# Patient Record
Sex: FEMALE | Race: BLACK OR AFRICAN AMERICAN | Employment: OTHER | URBAN - METROPOLITAN AREA
[De-identification: names, ages, dates, MRNs, and addresses within clinical notes are randomized per-mention and may not be internally consistent; named-entity substitution may affect disease eponyms.]

---

## 2017-02-20 RX ORDER — LOSARTAN POTASSIUM AND HYDROCHLOROTHIAZIDE 25; 100 MG/1; MG/1
TABLET ORAL
Qty: 90 TABLET | Refills: 0 | Status: SHIPPED | OUTPATIENT
Start: 2017-02-20 | End: 2017-10-31 | Stop reason: SDUPTHER

## 2017-03-27 ENCOUNTER — OFFICE VISIT (OUTPATIENT)
Dept: INTERNAL MEDICINE | Age: 72
End: 2017-03-27

## 2017-03-27 VITALS
WEIGHT: 191 LBS | DIASTOLIC BLOOD PRESSURE: 80 MMHG | SYSTOLIC BLOOD PRESSURE: 138 MMHG | BODY MASS INDEX: 29.98 KG/M2 | HEIGHT: 67 IN

## 2017-03-27 DIAGNOSIS — E66.09 NON MORBID OBESITY DUE TO EXCESS CALORIES: ICD-10-CM

## 2017-03-27 DIAGNOSIS — N18.2 CHRONIC KIDNEY DISEASE, STAGE II (MILD): Chronic | ICD-10-CM

## 2017-03-27 DIAGNOSIS — R73.9 HYPERGLYCEMIA: ICD-10-CM

## 2017-03-27 DIAGNOSIS — R76.8 HEPATITIS C ANTIBODY POSITIVE IN BLOOD: ICD-10-CM

## 2017-03-27 DIAGNOSIS — M17.0 PRIMARY OSTEOARTHRITIS OF BOTH KNEES: Primary | ICD-10-CM

## 2017-03-27 DIAGNOSIS — J45.20 MILD INTERMITTENT ASTHMA WITHOUT COMPLICATION: ICD-10-CM

## 2017-03-27 DIAGNOSIS — I10 ESSENTIAL HYPERTENSION: ICD-10-CM

## 2017-03-27 PROCEDURE — 99214 OFFICE O/P EST MOD 30 MIN: CPT | Performed by: INTERNAL MEDICINE

## 2017-03-27 RX ORDER — ALBUTEROL SULFATE 90 UG/1
2 AEROSOL, METERED RESPIRATORY (INHALATION) 3 TIMES DAILY PRN
Qty: 1 INHALER | Refills: 5 | Status: SHIPPED | OUTPATIENT
Start: 2017-03-27 | End: 2017-11-28 | Stop reason: SDUPTHER

## 2017-03-27 RX ORDER — LEDIPASVIR AND SOFOSBUVIR 90; 400 MG/1; MG/1
1 TABLET, FILM COATED ORAL DAILY
Qty: 30 TABLET | Refills: 2 | Status: SHIPPED
Start: 2017-03-27 | End: 2017-11-28 | Stop reason: SDUPTHER

## 2017-03-27 RX ORDER — MELOXICAM 15 MG/1
15 TABLET ORAL DAILY
Qty: 30 TABLET | Refills: 3 | Status: SHIPPED | OUTPATIENT
Start: 2017-03-27 | End: 2017-07-28 | Stop reason: SDUPTHER

## 2017-03-27 RX ORDER — BUDESONIDE AND FORMOTEROL FUMARATE DIHYDRATE 160; 4.5 UG/1; UG/1
2 AEROSOL RESPIRATORY (INHALATION) 2 TIMES DAILY
Qty: 1 INHALER | Refills: 5 | Status: SHIPPED | OUTPATIENT
Start: 2017-03-27 | End: 2017-11-28 | Stop reason: SDUPTHER

## 2017-03-27 ASSESSMENT — ENCOUNTER SYMPTOMS
BACK PAIN: 0
ABDOMINAL PAIN: 0
WHEEZING: 0
CHEST TIGHTNESS: 0
COLOR CHANGE: 0

## 2017-07-28 RX ORDER — MELOXICAM 15 MG/1
TABLET ORAL
Qty: 30 TABLET | Refills: 2 | Status: SHIPPED | OUTPATIENT
Start: 2017-07-28 | End: 2017-11-28 | Stop reason: SDUPTHER

## 2017-08-31 RX ORDER — AMLODIPINE BESYLATE 10 MG/1
TABLET ORAL
Qty: 30 TABLET | Refills: 5 | Status: SHIPPED | OUTPATIENT
Start: 2017-08-31 | End: 2017-11-28 | Stop reason: SDUPTHER

## 2017-11-07 ENCOUNTER — PATIENT MESSAGE (OUTPATIENT)
Dept: INTERNAL MEDICINE | Age: 72
End: 2017-11-07

## 2017-11-07 DIAGNOSIS — E55.9 VITAMIN D DEFICIENCY: ICD-10-CM

## 2017-11-07 DIAGNOSIS — Z00.00 ANNUAL PHYSICAL EXAM: Primary | ICD-10-CM

## 2017-11-07 NOTE — TELEPHONE ENCOUNTER
From: Antoinette Rangel  To:  Joshua Tomlin MD  Sent: 11/7/2017 7:55 AM EST  Subject: Prescription Question    I need a prescription to have lab work done before I see Dr. Joesph Clayton on Thursday, Nov. 9 at 11:30am   Thank you

## 2017-11-09 ENCOUNTER — OFFICE VISIT (OUTPATIENT)
Dept: INTERNAL MEDICINE | Age: 72
End: 2017-11-09

## 2017-11-09 VITALS
DIASTOLIC BLOOD PRESSURE: 80 MMHG | HEIGHT: 67 IN | SYSTOLIC BLOOD PRESSURE: 148 MMHG | BODY MASS INDEX: 30.13 KG/M2 | WEIGHT: 192 LBS

## 2017-11-09 DIAGNOSIS — E55.9 VITAMIN D DEFICIENCY: ICD-10-CM

## 2017-11-09 DIAGNOSIS — R76.8 HEPATITIS C ANTIBODY POSITIVE IN BLOOD: ICD-10-CM

## 2017-11-09 DIAGNOSIS — N18.2 CHRONIC KIDNEY DISEASE, STAGE II (MILD): Chronic | ICD-10-CM

## 2017-11-09 DIAGNOSIS — Z00.00 WELL ADULT EXAM: Primary | ICD-10-CM

## 2017-11-09 DIAGNOSIS — J45.20 MILD INTERMITTENT ASTHMA WITHOUT COMPLICATION: ICD-10-CM

## 2017-11-09 DIAGNOSIS — E66.09 CLASS 1 OBESITY DUE TO EXCESS CALORIES WITH SERIOUS COMORBIDITY AND BODY MASS INDEX (BMI) OF 30.0 TO 30.9 IN ADULT: ICD-10-CM

## 2017-11-09 DIAGNOSIS — M17.0 PRIMARY OSTEOARTHRITIS OF BOTH KNEES: ICD-10-CM

## 2017-11-09 DIAGNOSIS — Z00.00 ROUTINE GENERAL MEDICAL EXAMINATION AT A HEALTH CARE FACILITY: ICD-10-CM

## 2017-11-09 DIAGNOSIS — R73.9 HYPERGLYCEMIA: ICD-10-CM

## 2017-11-09 DIAGNOSIS — R53.83 FATIGUE, UNSPECIFIED TYPE: ICD-10-CM

## 2017-11-09 DIAGNOSIS — I10 ESSENTIAL HYPERTENSION: ICD-10-CM

## 2017-11-09 PROCEDURE — G0438 PPPS, INITIAL VISIT: HCPCS | Performed by: INTERNAL MEDICINE

## 2017-11-09 PROCEDURE — 99214 OFFICE O/P EST MOD 30 MIN: CPT | Performed by: INTERNAL MEDICINE

## 2017-11-09 ASSESSMENT — PATIENT HEALTH QUESTIONNAIRE - PHQ9: SUM OF ALL RESPONSES TO PHQ QUESTIONS 1-9: 0

## 2017-11-09 ASSESSMENT — ANXIETY QUESTIONNAIRES: GAD7 TOTAL SCORE: 0

## 2017-11-09 NOTE — PROGRESS NOTES
Subjective:      Patient ID: Trixie Meadows is a 81 y.o. female. Patient in for her annual wellness visit. She's been feeling fairly well but still struggling with diet and exercise and her weight has not come down any as of yet. Her blood pressures have been slightly up the past couple of days but otherwise have been well. She does not check her blood sugars but is trying to keep an eye on her carb intake with her history of hyperglycemia. She continues to have knee pain bilaterally and is trying to do her exercises regularly. she is anxious about her hep c results. Review of Systems   Constitutional: Negative for activity change, appetite change, fatigue and fever. HENT: Negative for congestion. Eyes: Negative for visual disturbance. Respiratory: Negative for chest tightness, shortness of breath and wheezing. Cardiovascular: Negative for chest pain and palpitations. Gastrointestinal: Negative for abdominal pain and nausea. Musculoskeletal: Negative for arthralgias and back pain. Skin: Negative for color change and rash. Neurological: Negative for speech difficulty, weakness, light-headedness and headaches. Psychiatric/Behavioral: Negative for dysphoric mood and sleep disturbance. The patient is not nervous/anxious. Objective:   Physical Exam   Constitutional: She is oriented to person, place, and time. She appears well-developed and well-nourished. HENT:   Head: Normocephalic and atraumatic. Mouth/Throat: No oropharyngeal exudate. Eyes: Conjunctivae and EOM are normal. Pupils are equal, round, and reactive to light. No scleral icterus. Neck: Normal range of motion. Neck supple. No thyromegaly present. Cardiovascular: Normal rate, regular rhythm and normal heart sounds. No carotid bruit auscultated bilaterally   Pulmonary/Chest: Effort normal and breath sounds normal. No respiratory distress. Abdominal: She exhibits no distension. There is no tenderness. tablet by mouth daily Yes Anshul Shrestha MD   Cholecalciferol (VITAMIN D) 2000 UNITS TABS Take 1 tablet by mouth daily Yes Anshul Shrestha MD   fluticasone (FLONASE) 50 MCG/ACT nasal spray 1 spray by Nasal route daily Yes Anshul Shrestha MD     Past Medical History:   Diagnosis Date    Allergic rhinitis, cause unspecified 8/23/2010    Asymptomatic varicose veins 8/23/2010    Calcium oxalate renal stones     Chronic kidney disease, stage II (mild) 8/23/2010    ? due to stones    Kidney stone on right side 11/11/2011    Leiomyoma of uterus, unspecified 8/23/2010    Obesity, unspecified 8/23/2010    Pain in limb 8/23/2010    Pap smear 7/16/2009    Negative    S/P colonoscopy 10/11/2006    Normal    Screening mammogram 8/4/2009    (Both) Negative    Unspecified asthma(493.90) 8/23/2010    Unspecified essential hypertension 8/23/2010     Past Surgical History:   Procedure Laterality Date    COLONOSCOPY  07/05/2016    LITHOTRIPSY      x2 last 1/2011    TOTAL NEPHRECTOMY      4/2013 right due to chronic infection     Family History   Problem Relation Age of Onset    Hypertension Mother     Hypertension Father     Stroke Father     Heart Disease Sister     Prostate Cancer Brother        CareTeam (Including outside providers/suppliers regularly involved in providing care):   Patient Care Team:  Anshul Shrestha MD as PCP - General  Fidel Gomez MD as Consulting Physician (Urology)  Grace Fuentes MD as Consulting Physician (Otolaryngology)  Tereza Zepeda MD as Consulting Physician (Urology)    Wt Readings from Last 3 Encounters:   11/09/17 192 lb (87.1 kg)   03/27/17 191 lb (86.6 kg)   11/14/16 186 lb (84.4 kg)     Vitals:    11/09/17 1159 11/09/17 1203   BP: (!) 150/80 (!) 148/80   Weight: 192 lb (87.1 kg)    Height: 5' 7\" (1.702 m)            The following problems were reviewed today and where indicated follow up appointments were made and/or referrals ordered.     Risk Factor Screenings

## 2017-11-09 NOTE — ASSESSMENT & PLAN NOTE
Stable- Need to control all risk factors- monitor blood pressure, blood sugars , and lipids carefully and treat aggressively to avoid progression of renal disease

## 2017-11-09 NOTE — PATIENT INSTRUCTIONS
Get wrist bp cuff  Check bp 3-4 times a week preferably when you get home from work after sitting for 10 mins and in a chair w a back and both feet down  Goal is under 140/85    See eye doctor  See me in 6 mos     Personalized Preventive Plan for Wei Kaur - 11/9/2017  Medicare offers a range of preventive health benefits. Some of the tests and screenings are paid in full while other may be subject to a deductible, co-insurance, and/or copay. Some of these benefits include a comprehensive review of your medical history including lifestyle, illnesses that may run in your family, and various assessments and screenings as appropriate. After reviewing your medical record and screening and assessments performed today your provider may have ordered immunizations, labs, imaging, and/or referrals for you. A list of these orders (if applicable) as well as your Preventive Care list are included within your After Visit Summary for your review. Other Preventive Recommendations:    · A preventive eye exam performed by an eye specialist is recommended every 1-2 years to screen for glaucoma; cataracts, macular degeneration, and other eye disorders. · A preventive dental visit is recommended every 6 months. · Try to get at least 150 minutes of exercise per week or 10,000 steps per day on a pedometer . · Order or download the FREE \"Exercise & Physical Activity: Your Everyday Guide\" from [employer]. Call 0-733.324.5676 or search [employer] online. · You need 9232-3443 mg of calcium and 5831-3385 IU of vitamin D per day. It is possible to meet your calcium requirement with diet alone, but a vitamin D supplement is usually necessary to meet this goal.  · When exposed to the sun, use a sunscreen that protects against both UVA and UVB radiation with an SPF of 30 or greater. Reapply every 2 to 3 hours or after sweating, drying off with a towel, or swimming.   · Always wear a seat belt when traveling in a car. Always wear a helmet when riding a bicycle or motorcycle.

## 2017-11-14 ASSESSMENT — ENCOUNTER SYMPTOMS
SHORTNESS OF BREATH: 0
BACK PAIN: 0
CHEST TIGHTNESS: 0
COLOR CHANGE: 0
WHEEZING: 0
NAUSEA: 0
ABDOMINAL PAIN: 0

## 2017-11-20 ENCOUNTER — HOSPITAL ENCOUNTER (OUTPATIENT)
Dept: MAMMOGRAPHY | Age: 72
Discharge: HOME | End: 2017-11-20
Attending: INTERNAL MEDICINE | Admitting: INTERNAL MEDICINE

## 2017-11-20 DIAGNOSIS — Z12.31 VISIT FOR SCREENING MAMMOGRAM: ICD-10-CM

## 2017-11-28 ENCOUNTER — TELEPHONE (OUTPATIENT)
Dept: INTERNAL MEDICINE | Age: 72
End: 2017-11-28

## 2017-11-28 RX ORDER — LEDIPASVIR AND SOFOSBUVIR 90; 400 MG/1; MG/1
1 TABLET, FILM COATED ORAL DAILY
Qty: 30 TABLET | Refills: 5 | Status: SHIPPED | OUTPATIENT
Start: 2017-11-28 | End: 2018-01-22

## 2017-11-28 RX ORDER — AMLODIPINE BESYLATE 10 MG/1
TABLET ORAL
Qty: 30 TABLET | Refills: 5 | Status: SHIPPED | OUTPATIENT
Start: 2017-11-28 | End: 2018-05-09 | Stop reason: SDUPTHER

## 2017-11-28 RX ORDER — BUDESONIDE AND FORMOTEROL FUMARATE DIHYDRATE 160; 4.5 UG/1; UG/1
2 AEROSOL RESPIRATORY (INHALATION) 2 TIMES DAILY
Qty: 1 INHALER | Refills: 5 | Status: SHIPPED | OUTPATIENT
Start: 2017-11-28 | End: 2018-05-09

## 2017-11-28 RX ORDER — MELOXICAM 15 MG/1
TABLET ORAL
Qty: 30 TABLET | Refills: 5 | Status: SHIPPED | OUTPATIENT
Start: 2017-11-28 | End: 2018-05-09

## 2017-11-28 RX ORDER — AMLODIPINE BESYLATE 10 MG/1
10 TABLET ORAL DAILY
Qty: 90 TABLET | Refills: 1 | Status: SHIPPED | OUTPATIENT
Start: 2017-11-28 | End: 2018-01-11

## 2017-11-28 RX ORDER — LOSARTAN POTASSIUM AND HYDROCHLOROTHIAZIDE 12.5; 1 MG/1; MG/1
TABLET ORAL
Qty: 30 TABLET | Refills: 5 | Status: SHIPPED | OUTPATIENT
Start: 2017-11-28 | End: 2018-05-09 | Stop reason: SDUPTHER

## 2017-11-28 RX ORDER — ALBUTEROL SULFATE 90 UG/1
2 AEROSOL, METERED RESPIRATORY (INHALATION) 3 TIMES DAILY PRN
Qty: 1 INHALER | Refills: 5 | Status: SHIPPED | OUTPATIENT
Start: 2017-11-28 | End: 2018-05-09 | Stop reason: DRUGHIGH

## 2017-11-28 NOTE — TELEPHONE ENCOUNTER
Pt needs refill on following  losartan-hydrochlorothiazide (HYZAAR) 100-12.5 MG per tablet   amLODIPine (NORVASC) 10 MG tablet   meloxicam (MOBIC) 15 MG   ledipasvir-sofosbuvir (HARVONI)  MG  budesonide-formoterol (SYMBICORT) 160-4.5 MCG/ACT AERO   albuterol sulfate HFA (VENTOLIN HFA) 108 (90 BASE) MCG/ACT   Pharmacy on file verified.

## 2018-01-11 ENCOUNTER — OFFICE VISIT (OUTPATIENT)
Dept: INTERNAL MEDICINE | Age: 73
End: 2018-01-11

## 2018-01-11 VITALS
BODY MASS INDEX: 30.13 KG/M2 | SYSTOLIC BLOOD PRESSURE: 148 MMHG | DIASTOLIC BLOOD PRESSURE: 98 MMHG | WEIGHT: 192 LBS | HEIGHT: 67 IN | TEMPERATURE: 98.5 F

## 2018-01-11 DIAGNOSIS — N18.2 CHRONIC KIDNEY DISEASE, STAGE II (MILD): Chronic | ICD-10-CM

## 2018-01-11 DIAGNOSIS — J06.9 VIRAL UPPER RESPIRATORY TRACT INFECTION: ICD-10-CM

## 2018-01-11 DIAGNOSIS — I10 ESSENTIAL HYPERTENSION: ICD-10-CM

## 2018-01-11 DIAGNOSIS — E66.09 CLASS 1 OBESITY DUE TO EXCESS CALORIES WITH SERIOUS COMORBIDITY AND BODY MASS INDEX (BMI) OF 30.0 TO 30.9 IN ADULT: ICD-10-CM

## 2018-01-11 DIAGNOSIS — R05.9 COUGH: Primary | ICD-10-CM

## 2018-01-11 DIAGNOSIS — J45.20 MILD INTERMITTENT ASTHMA WITHOUT COMPLICATION: ICD-10-CM

## 2018-01-11 PROCEDURE — 99214 OFFICE O/P EST MOD 30 MIN: CPT | Performed by: INTERNAL MEDICINE

## 2018-01-11 RX ORDER — PROMETHAZINE HYDROCHLORIDE AND CODEINE PHOSPHATE 6.25; 1 MG/5ML; MG/5ML
5 SYRUP ORAL EVERY 4 HOURS PRN
Qty: 240 ML | Refills: 1 | Status: SHIPPED | OUTPATIENT
Start: 2018-01-11 | End: 2018-04-28 | Stop reason: SDUPTHER

## 2018-01-11 RX ORDER — AZITHROMYCIN 250 MG/1
TABLET, FILM COATED ORAL
Qty: 1 PACKET | Refills: 1 | Status: SHIPPED | OUTPATIENT
Start: 2018-01-11 | End: 2018-01-21

## 2018-01-12 ASSESSMENT — ENCOUNTER SYMPTOMS
ABDOMINAL PAIN: 0
CHEST TIGHTNESS: 0
BACK PAIN: 0
RHINORRHEA: 1
COLOR CHANGE: 0
COUGH: 1
WHEEZING: 0

## 2018-01-16 ENCOUNTER — TELEPHONE (OUTPATIENT)
Dept: INTERNAL MEDICINE | Age: 73
End: 2018-01-16

## 2018-01-16 NOTE — TELEPHONE ENCOUNTER
Have her increase to 2 of the 120 mg calan at the same time daily and call in the 240 24 hr caps/tabs 1 daily- have her see me in the office next Monday and bring in bp readings then

## 2018-01-17 RX ORDER — VERAPAMIL HYDROCHLORIDE 240 MG/1
240 TABLET, FILM COATED, EXTENDED RELEASE ORAL DAILY
Qty: 30 TABLET | Refills: 1 | Status: SHIPPED | OUTPATIENT
Start: 2018-01-17 | End: 2018-03-31 | Stop reason: SDUPTHER

## 2018-01-22 ENCOUNTER — OFFICE VISIT (OUTPATIENT)
Dept: INTERNAL MEDICINE | Age: 73
End: 2018-01-22

## 2018-01-22 VITALS
HEART RATE: 86 BPM | WEIGHT: 192 LBS | HEIGHT: 67 IN | OXYGEN SATURATION: 98 % | DIASTOLIC BLOOD PRESSURE: 80 MMHG | SYSTOLIC BLOOD PRESSURE: 160 MMHG | BODY MASS INDEX: 30.13 KG/M2

## 2018-01-22 DIAGNOSIS — E66.09 CLASS 1 OBESITY DUE TO EXCESS CALORIES WITH SERIOUS COMORBIDITY AND BODY MASS INDEX (BMI) OF 30.0 TO 30.9 IN ADULT: ICD-10-CM

## 2018-01-22 DIAGNOSIS — N18.2 CHRONIC KIDNEY DISEASE, STAGE II (MILD): Chronic | ICD-10-CM

## 2018-01-22 DIAGNOSIS — J45.20 MILD INTERMITTENT ASTHMA WITHOUT COMPLICATION: ICD-10-CM

## 2018-01-22 DIAGNOSIS — I10 ESSENTIAL HYPERTENSION: Primary | ICD-10-CM

## 2018-01-22 PROCEDURE — 99214 OFFICE O/P EST MOD 30 MIN: CPT | Performed by: INTERNAL MEDICINE

## 2018-01-22 RX ORDER — CLONIDINE HYDROCHLORIDE 0.1 MG/1
0.1 TABLET ORAL 2 TIMES DAILY
Qty: 60 TABLET | Refills: 3 | Status: SHIPPED | OUTPATIENT
Start: 2018-01-22 | End: 2018-05-09 | Stop reason: SDUPTHER

## 2018-01-22 ASSESSMENT — ENCOUNTER SYMPTOMS
CHEST TIGHTNESS: 0
BACK PAIN: 0
ABDOMINAL PAIN: 0
WHEEZING: 0
COLOR CHANGE: 0

## 2018-01-22 NOTE — PROGRESS NOTES
Subjective:      Patient ID: Petar Schuster is a 81 y.o. female. Still struggling w bp's being up and diet and weight-finally feeling well enough to return to work- asthma well controlled. Urinary issues still bothersome w incontinence/pelvic relaxation but sl improved    Review of Systems   Constitutional: Negative for activity change, appetite change and fatigue. HENT: Negative for congestion. Eyes: Negative for visual disturbance. Respiratory: Negative for chest tightness and wheezing. Cardiovascular: Negative for chest pain and palpitations. Gastrointestinal: Negative for abdominal pain. Musculoskeletal: Negative for arthralgias and back pain. Skin: Negative for color change and rash. Neurological: Negative for weakness, light-headedness and headaches. Psychiatric/Behavioral: Negative for dysphoric mood and sleep disturbance. The patient is not nervous/anxious. Objective:   Physical Exam   Constitutional: She is oriented to person, place, and time. She appears well-developed and well-nourished. HENT:   Head: Normocephalic and atraumatic. Mouth/Throat: No oropharyngeal exudate. Eyes: Conjunctivae and EOM are normal. Pupils are equal, round, and reactive to light. No scleral icterus. Neck: Normal range of motion. Neck supple. No thyromegaly present. Cardiovascular: Normal rate, regular rhythm and normal heart sounds. No carotid bruit auscultated bilaterally   Pulmonary/Chest: Effort normal and breath sounds normal. No respiratory distress. She has no wheezes. She has no rales. Abdominal: She exhibits no distension. There is no tenderness. Lymphadenopathy:     She has no cervical adenopathy. Neurological: She is alert and oriented to person, place, and time. No cranial nerve deficit. Skin: Skin is warm and dry. Psychiatric: She has a normal mood and affect.  Her behavior is normal. Judgment and thought content normal.         Assessment and Plan:      Essential

## 2018-01-22 NOTE — PATIENT INSTRUCTIONS
Check bp am and pm for the next week or so and if running above 140 take the 0.1 mg clonidine  If in another 10-14 days the bp is still running above 10 increase the clonidine to 2 tabs at a time and  Monitor

## 2018-02-02 ENCOUNTER — TELEPHONE (OUTPATIENT)
Dept: INTERNAL MEDICINE | Age: 73
End: 2018-02-02

## 2018-02-02 DIAGNOSIS — J45.909 ASTHMA, UNSPECIFIED ASTHMA SEVERITY, UNSPECIFIED WHETHER COMPLICATED, UNSPECIFIED WHETHER PERSISTENT: Primary | ICD-10-CM

## 2018-03-28 ENCOUNTER — PATIENT MESSAGE (OUTPATIENT)
Dept: INTERNAL MEDICINE | Age: 73
End: 2018-03-28

## 2018-04-19 ENCOUNTER — PATIENT MESSAGE (OUTPATIENT)
Dept: INTERNAL MEDICINE | Age: 73
End: 2018-04-19

## 2018-04-19 RX ORDER — AZITHROMYCIN 250 MG/1
TABLET, FILM COATED ORAL
Qty: 6 TABLET | Refills: 0 | Status: SHIPPED | OUTPATIENT
Start: 2018-04-19 | End: 2018-05-09

## 2018-04-28 DIAGNOSIS — R05.9 COUGH: ICD-10-CM

## 2018-04-30 RX ORDER — PROMETHAZINE HYDROCHLORIDE AND CODEINE PHOSPHATE 6.25; 1 MG/5ML; MG/5ML
SYRUP ORAL
Qty: 240 ML | Refills: 0 | OUTPATIENT
Start: 2018-04-30 | End: 2018-05-10

## 2018-05-09 ENCOUNTER — OFFICE VISIT (OUTPATIENT)
Dept: INTERNAL MEDICINE | Age: 73
End: 2018-05-09

## 2018-05-09 VITALS
SYSTOLIC BLOOD PRESSURE: 130 MMHG | WEIGHT: 188 LBS | HEIGHT: 67 IN | DIASTOLIC BLOOD PRESSURE: 82 MMHG | BODY MASS INDEX: 29.51 KG/M2

## 2018-05-09 DIAGNOSIS — R73.9 HYPERGLYCEMIA: ICD-10-CM

## 2018-05-09 DIAGNOSIS — I10 ESSENTIAL HYPERTENSION: Primary | ICD-10-CM

## 2018-05-09 DIAGNOSIS — J30.9 CHRONIC ALLERGIC RHINITIS, UNSPECIFIED SEASONALITY, UNSPECIFIED TRIGGER: ICD-10-CM

## 2018-05-09 DIAGNOSIS — E66.09 CLASS 1 OBESITY DUE TO EXCESS CALORIES WITH SERIOUS COMORBIDITY AND BODY MASS INDEX (BMI) OF 30.0 TO 30.9 IN ADULT: ICD-10-CM

## 2018-05-09 DIAGNOSIS — J45.20 MILD INTERMITTENT ASTHMA WITHOUT COMPLICATION: ICD-10-CM

## 2018-05-09 PROCEDURE — 99214 OFFICE O/P EST MOD 30 MIN: CPT | Performed by: INTERNAL MEDICINE

## 2018-05-09 RX ORDER — ALBUTEROL SULFATE 90 UG/1
1 AEROSOL, METERED RESPIRATORY (INHALATION) 2 TIMES DAILY
COMMUNITY
End: 2020-06-02 | Stop reason: ALTCHOICE

## 2018-05-09 RX ORDER — AMLODIPINE BESYLATE 10 MG/1
TABLET ORAL
Qty: 90 TABLET | Refills: 3 | Status: SHIPPED | OUTPATIENT
Start: 2018-05-09 | End: 2019-03-26 | Stop reason: ALTCHOICE

## 2018-05-09 RX ORDER — VERAPAMIL HYDROCHLORIDE 240 MG/1
TABLET, FILM COATED, EXTENDED RELEASE ORAL
Qty: 90 TABLET | Refills: 3 | Status: SHIPPED | OUTPATIENT
Start: 2018-05-09 | End: 2018-12-11 | Stop reason: SDUPTHER

## 2018-05-09 RX ORDER — FLUTICASONE PROPIONATE 220 UG/1
1 AEROSOL, METERED RESPIRATORY (INHALATION)
COMMUNITY
Start: 2018-04-23 | End: 2018-08-15

## 2018-05-09 RX ORDER — CLONIDINE HYDROCHLORIDE 0.1 MG/1
0.1 TABLET ORAL 2 TIMES DAILY
Qty: 180 TABLET | Refills: 3 | Status: SHIPPED | OUTPATIENT
Start: 2018-05-09 | End: 2018-12-11 | Stop reason: SDUPTHER

## 2018-05-09 RX ORDER — MONTELUKAST SODIUM 10 MG/1
10 TABLET ORAL NIGHTLY
Qty: 90 TABLET | Refills: 3 | Status: SHIPPED | OUTPATIENT
Start: 2018-05-09 | End: 2019-10-22 | Stop reason: SDUPTHER

## 2018-05-09 RX ORDER — MONTELUKAST SODIUM 10 MG/1
10 TABLET ORAL
COMMUNITY
Start: 2018-02-13 | End: 2018-05-09 | Stop reason: SDUPTHER

## 2018-05-09 RX ORDER — LOSARTAN POTASSIUM AND HYDROCHLOROTHIAZIDE 12.5; 1 MG/1; MG/1
TABLET ORAL
Qty: 90 TABLET | Refills: 3 | Status: SHIPPED | OUTPATIENT
Start: 2018-05-09 | End: 2018-06-12

## 2018-05-09 ASSESSMENT — ENCOUNTER SYMPTOMS
BACK PAIN: 0
WHEEZING: 0
CHEST TIGHTNESS: 0
COLOR CHANGE: 0
ABDOMINAL PAIN: 0

## 2018-06-06 LAB
ALBUMIN SERPL-MCNC: 5 G/DL (ref 3.5–5.7)
ANION GAP SERPL CALCULATED.3IONS-SCNC: 8 MMOL/L (ref 3–16)
BUN BLDV-MCNC: 37 MG/DL (ref 7–25)
CALCIUM SERPL-MCNC: 10.6 MG/DL (ref 8.6–10.3)
CHLORIDE BLD-SCNC: 107 MMOL/L (ref 98–110)
CHOLESTEROL, TOTAL: 200 MG/DL (ref 0–200)
CO2: 27 MMOL/L (ref 21–33)
CREAT SERPL-MCNC: 1.51 MG/DL (ref 0.6–1.3)
CREATININE URINE: 38.8 MG/DL
CREATININE URINE: 38.8 MG/DL
GFR, ESTIMATED: 35 SEE NOTE.
GFR, ESTIMATED: 40 SEE NOTE.
GLUCOSE BLD-MCNC: 101 MG/DL (ref 70–100)
HBA1C MFR BLD: 5.6 % (ref 4.8–6.4)
HDLC SERPL-MCNC: 46 MG/DL (ref 60–92)
LDL CHOLESTEROL CALCULATED: 137 MG/DL
MICROALBUMIN UR-MCNC: <7 MG/L (ref 0–17)
MICROALBUMIN/CREAT UR-RTO: NORMAL MG/G (ref 0–30)
OSMOLALITY CALCULATION: 303 MOSM/KG (ref 278–305)
PHOSPHORUS: 3.4 MG/DL (ref 2.1–4.5)
POTASSIUM SERPL-SCNC: 4.5 MMOL/L (ref 3.5–5.3)
PROTEIN, URINE, RANDOM: <4 MG/DL
PROTEIN/CREAT RATIO URINE RAN: NORMAL RATIO
SODIUM BLD-SCNC: 142 MMOL/L (ref 133–146)
TRIGL SERPL-MCNC: 86 MG/DL (ref 10–149)
VITAMIN D 25-HYDROXY: 38.3 NG/ML (ref 30–100)

## 2018-06-11 ENCOUNTER — TELEPHONE (OUTPATIENT)
Dept: INTERNAL MEDICINE | Age: 73
End: 2018-06-11

## 2018-06-13 ENCOUNTER — TELEPHONE (OUTPATIENT)
Dept: INTERNAL MEDICINE | Age: 73
End: 2018-06-13

## 2018-06-13 DIAGNOSIS — N18.2 CHRONIC KIDNEY DISEASE, STAGE II (MILD): Primary | Chronic | ICD-10-CM

## 2018-06-13 DIAGNOSIS — I10 ESSENTIAL HYPERTENSION: Primary | ICD-10-CM

## 2018-06-13 RX ORDER — LOSARTAN POTASSIUM 100 MG/1
100 TABLET ORAL DAILY
Qty: 30 TABLET | Refills: 3 | Status: SHIPPED | OUTPATIENT
Start: 2018-06-13 | End: 2018-10-09 | Stop reason: SDUPTHER

## 2018-08-15 ENCOUNTER — OFFICE VISIT (OUTPATIENT)
Dept: INTERNAL MEDICINE | Age: 73
End: 2018-08-15

## 2018-08-15 VITALS
SYSTOLIC BLOOD PRESSURE: 122 MMHG | WEIGHT: 190 LBS | BODY MASS INDEX: 29.82 KG/M2 | HEIGHT: 67 IN | DIASTOLIC BLOOD PRESSURE: 78 MMHG

## 2018-08-15 DIAGNOSIS — I10 ESSENTIAL HYPERTENSION: ICD-10-CM

## 2018-08-15 DIAGNOSIS — J45.20 MILD INTERMITTENT ASTHMA WITHOUT COMPLICATION: ICD-10-CM

## 2018-08-15 DIAGNOSIS — J40 BRONCHITIS: ICD-10-CM

## 2018-08-15 DIAGNOSIS — N18.2 CHRONIC KIDNEY DISEASE, STAGE II (MILD): Chronic | ICD-10-CM

## 2018-08-15 DIAGNOSIS — E66.09 CLASS 1 OBESITY DUE TO EXCESS CALORIES WITH SERIOUS COMORBIDITY AND BODY MASS INDEX (BMI) OF 30.0 TO 30.9 IN ADULT: ICD-10-CM

## 2018-08-15 PROCEDURE — 99214 OFFICE O/P EST MOD 30 MIN: CPT | Performed by: INTERNAL MEDICINE

## 2018-08-15 RX ORDER — AZITHROMYCIN 250 MG/1
TABLET, FILM COATED ORAL
Qty: 1 PACKET | Refills: 1 | Status: SHIPPED | OUTPATIENT
Start: 2018-08-15 | End: 2018-08-25

## 2018-08-15 RX ORDER — METHYLPREDNISOLONE 4 MG/1
TABLET ORAL
Qty: 1 KIT | Refills: 0 | Status: SHIPPED | OUTPATIENT
Start: 2018-08-15 | End: 2018-12-11

## 2018-08-15 ASSESSMENT — ENCOUNTER SYMPTOMS
WHEEZING: 1
CHEST TIGHTNESS: 0
ABDOMINAL PAIN: 0
BACK PAIN: 0
COUGH: 1
COLOR CHANGE: 0

## 2018-08-15 NOTE — ASSESSMENT & PLAN NOTE
Recheck next labs and control all risk factors- monitor blood pressure, blood sugars , and lipids carefully and treat aggressively to avoid progression of renal disease

## 2018-08-15 NOTE — PROGRESS NOTES
Subjective:      Patient ID: Umberto Milian is a 72 y.o. female. Chief Complaint   Patient presents with    Cough     x 3 wks. cough ,yellowish phlegm,wheezing     Has had cough for 3 weeks- started w allergies no URI- wheezing a great deal- no fevers or sob- still struggling w her diet and weight-not able to exercise as much as she should-using flovent 2 puffs tid and using albuterol only rarely-bp's good     Review of Systems   Constitutional: Positive for fatigue. Negative for activity change and appetite change. HENT: Negative for congestion. Eyes: Negative for visual disturbance. Respiratory: Positive for cough and wheezing. Negative for chest tightness. Cardiovascular: Negative for chest pain and palpitations. Gastrointestinal: Negative for abdominal pain. Musculoskeletal: Negative for arthralgias and back pain. Skin: Negative for color change and rash. Neurological: Negative for weakness, light-headedness and headaches. Psychiatric/Behavioral: Positive for sleep disturbance. Objective:   Physical Exam   Constitutional: She is oriented to person, place, and time. She appears well-developed and well-nourished. HENT:   Head: Normocephalic and atraumatic. Mouth/Throat: No oropharyngeal exudate. Eyes: Pupils are equal, round, and reactive to light. Conjunctivae and EOM are normal.   Neck: Normal range of motion. Neck supple. No thyromegaly present. Cardiovascular: Normal rate, regular rhythm and normal heart sounds. No carotid bruit auscultated bilaterally   Pulmonary/Chest: Effort normal. No respiratory distress. She has wheezes. She has no rales. She exhibits no tenderness. Abdominal: She exhibits no distension. There is no tenderness. Lymphadenopathy:     She has no cervical adenopathy. Neurological: She is alert and oriented to person, place, and time. Skin: Skin is warm and dry. Psychiatric: She has a normal mood and affect.  Her behavior is normal. Judgment

## 2018-10-09 RX ORDER — LOSARTAN POTASSIUM 100 MG/1
TABLET ORAL
Qty: 30 TABLET | Refills: 2 | Status: SHIPPED | OUTPATIENT
Start: 2018-10-09 | End: 2018-12-11 | Stop reason: SDUPTHER

## 2018-12-11 ENCOUNTER — OFFICE VISIT (OUTPATIENT)
Dept: INTERNAL MEDICINE CLINIC | Age: 73
End: 2018-12-11
Payer: COMMERCIAL

## 2018-12-11 VITALS
DIASTOLIC BLOOD PRESSURE: 84 MMHG | HEIGHT: 67 IN | BODY MASS INDEX: 29.82 KG/M2 | SYSTOLIC BLOOD PRESSURE: 136 MMHG | WEIGHT: 190 LBS

## 2018-12-11 DIAGNOSIS — J45.20 MILD INTERMITTENT ASTHMA WITHOUT COMPLICATION: ICD-10-CM

## 2018-12-11 DIAGNOSIS — E66.09 CLASS 1 OBESITY DUE TO EXCESS CALORIES WITH SERIOUS COMORBIDITY AND BODY MASS INDEX (BMI) OF 30.0 TO 30.9 IN ADULT: ICD-10-CM

## 2018-12-11 DIAGNOSIS — M25.562 CHRONIC PAIN OF BOTH KNEES: ICD-10-CM

## 2018-12-11 DIAGNOSIS — J32.9 CHRONIC SINUSITIS, UNSPECIFIED LOCATION: ICD-10-CM

## 2018-12-11 DIAGNOSIS — Z00.00 WELL ADULT EXAM: Primary | ICD-10-CM

## 2018-12-11 DIAGNOSIS — I10 ESSENTIAL HYPERTENSION: ICD-10-CM

## 2018-12-11 DIAGNOSIS — N18.2 CHRONIC KIDNEY DISEASE, STAGE II (MILD): Chronic | ICD-10-CM

## 2018-12-11 DIAGNOSIS — G89.29 CHRONIC PAIN OF BOTH KNEES: ICD-10-CM

## 2018-12-11 DIAGNOSIS — R73.9 HYPERGLYCEMIA: ICD-10-CM

## 2018-12-11 DIAGNOSIS — M25.561 CHRONIC PAIN OF BOTH KNEES: ICD-10-CM

## 2018-12-11 PROBLEM — J40 BRONCHITIS: Status: RESOLVED | Noted: 2018-08-15 | Resolved: 2018-12-11

## 2018-12-11 PROCEDURE — 99397 PER PM REEVAL EST PAT 65+ YR: CPT | Performed by: INTERNAL MEDICINE

## 2018-12-11 RX ORDER — VERAPAMIL HYDROCHLORIDE 240 MG/1
TABLET, FILM COATED, EXTENDED RELEASE ORAL
Qty: 90 TABLET | Refills: 3 | Status: SHIPPED | OUTPATIENT
Start: 2018-12-11 | End: 2019-12-19 | Stop reason: ALTCHOICE

## 2018-12-11 RX ORDER — LOSARTAN POTASSIUM 100 MG/1
TABLET ORAL
Qty: 90 TABLET | Refills: 3 | Status: SHIPPED | OUTPATIENT
Start: 2018-12-11 | End: 2019-01-14

## 2018-12-11 RX ORDER — CLONIDINE HYDROCHLORIDE 0.1 MG/1
0.1 TABLET ORAL 3 TIMES DAILY
Qty: 270 TABLET | Refills: 3 | Status: SHIPPED | OUTPATIENT
Start: 2018-12-11 | End: 2019-01-04 | Stop reason: SDUPTHER

## 2018-12-11 ASSESSMENT — ENCOUNTER SYMPTOMS
COLOR CHANGE: 0
BACK PAIN: 0
CHEST TIGHTNESS: 0
WHEEZING: 0
ABDOMINAL PAIN: 0

## 2018-12-11 ASSESSMENT — LIFESTYLE VARIABLES
HOW OFTEN DO YOU HAVE SIX OR MORE DRINKS ON ONE OCCASION: 0
AUDIT-C TOTAL SCORE: 1
HOW MANY STANDARD DRINKS CONTAINING ALCOHOL DO YOU HAVE ON A TYPICAL DAY: 0
HOW OFTEN DO YOU HAVE A DRINK CONTAINING ALCOHOL: 1

## 2018-12-11 ASSESSMENT — PATIENT HEALTH QUESTIONNAIRE - PHQ9
SUM OF ALL RESPONSES TO PHQ QUESTIONS 1-9: 0

## 2018-12-11 ASSESSMENT — ANXIETY QUESTIONNAIRES: GAD7 TOTAL SCORE: 0

## 2018-12-11 NOTE — PROGRESS NOTES
Subjective:      Patient ID: Yvette Kiran is a 72 y.o. female. Chief Complaint   Patient presents with    Medicare AWV     yearly/review labs       Sleeping better now that son moved out w dog- still working on diet and exercise- no progress w wt loss- bp's have been very good at home in 130/80 range- knees are still very painful but improved w prednisone taken for allergies- trying to do knee exercises and did not do prednisone injections-         Yvette Kiran  1945    Allergies   Allergen Reactions    Amoxicillin     Sulfa Antibiotics      Current Outpatient Prescriptions   Medication Sig Dispense Refill    losartan (COZAAR) 100 MG tablet TAKE ONE TABLET BY MOUTH DAILY 90 tablet 3    verapamil (CALAN SR) 240 MG extended release tablet TAKE ONE TABLET BY MOUTH DAILY 90 tablet 3    cloNIDine (CATAPRES) 0.1 MG tablet Take 1 tablet by mouth 3 times daily Note change in sig to tid 270 tablet 3    albuterol sulfate  (90 Base) MCG/ACT inhaler Inhale 1 puff into the lungs 2 times daily      montelukast (SINGULAIR) 10 MG tablet Take 1 tablet by mouth nightly 90 tablet 3    amLODIPine (NORVASC) 10 MG tablet TAKE ONE TABLET BY MOUTH DAILY 90 tablet 3    Cholecalciferol (VITAMIN D) 2000 UNITS TABS Take 1 tablet by mouth daily 90 tablet 3    fluticasone (FLONASE) 50 MCG/ACT nasal spray 1 spray by Nasal route daily 16 g 12     No current facility-administered medications for this visit. Vitals:    12/11/18 1559 12/11/18 1601 12/11/18 1621   BP: (!) 144/90 (!) 144/90 136/84   Weight: 190 lb (86.2 kg)     Height: 5' 7\" (1.702 m)       Body mass index is 29.76 kg/m².      Wt Readings from Last 3 Encounters:   12/11/18 190 lb (86.2 kg)   08/15/18 190 lb (86.2 kg)   05/09/18 188 lb (85.3 kg)     BP Readings from Last 3 Encounters:   12/11/18 136/84   08/15/18 122/78   05/09/18 130/82         Immunization History   Administered Date(s) Administered    DTaP 08/05/2003    Influenza Vaccine, She has no cervical adenopathy. Neurological: She is alert and oriented to person, place, and time. She has normal strength and normal reflexes. No cranial nerve deficit. Skin: Skin is warm, dry and intact. No rash noted. Psychiatric: She has a normal mood and affect. Her speech is normal and behavior is normal. Judgment and thought content normal. Cognition and memory are normal.         Assessment and Plan:      Essential hypertension  Controlled w current regimen- continue and monitor closely      Bilateral knee pain  Work on diet and exercises     Chronic sinusitis  S/p prednisone- better now     Class 1 obesity due to excess calories with serious comorbidity and body mass index (BMI) of 30.0 to 30.9 in adult  Discussed with patient at length health risks of obesity and need for diet and exercise      Chronic kidney disease, stage II (mild)  Stable     Hyperglycemia  Cont to work on diet and exercise     Asthma  For possible new injection per pulmonary          Complete physical completed. Patient now up to date with all routine screening including Pap and colonoscopy if needed, yearly eye and dental exams,labs, dexa if indicated. Counseled re: nutrition/calciumand vit d supplementation,seat belt use, no cell phone use with driving.

## 2019-01-04 RX ORDER — CLONIDINE HYDROCHLORIDE 0.1 MG/1
0.1 TABLET ORAL 3 TIMES DAILY
Qty: 270 TABLET | Refills: 3 | Status: SHIPPED | OUTPATIENT
Start: 2019-01-04 | End: 2020-01-09

## 2019-01-09 LAB
ALBUMIN SERPL-MCNC: 5 G/DL (ref 3.5–5.7)
ANION GAP SERPL CALCULATED.3IONS-SCNC: 8 MMOL/L (ref 3–16)
BUN BLDV-MCNC: 33 MG/DL (ref 7–25)
CALCIUM SERPL-MCNC: 10.7 MG/DL (ref 8.6–10.3)
CHLORIDE BLD-SCNC: 105 MMOL/L (ref 98–110)
CHOLESTEROL, TOTAL: 199 MG/DL (ref 0–200)
CO2: 30 MMOL/L (ref 21–33)
CREAT SERPL-MCNC: 1.5 MG/DL (ref 0.6–1.3)
CREATININE URINE: 63.9 MG/DL
GFR, ESTIMATED: 35 SEE NOTE.
GFR, ESTIMATED: 40 SEE NOTE.
GLUCOSE BLD-MCNC: 92 MG/DL (ref 70–100)
HBA1C MFR BLD: 6.1 % (ref 4.8–6.4)
HDLC SERPL-MCNC: 51 MG/DL (ref 60–92)
LDL CHOLESTEROL CALCULATED: 134 MG/DL
MICROALBUMIN UR-MCNC: 19.6 MG/L (ref 0–17)
MICROALBUMIN/CREAT UR-RTO: 30.7 MG/G (ref 0–30)
OSMOLALITY CALCULATION: 303 MOSM/KG (ref 278–305)
PHOSPHORUS: 3.5 MG/DL (ref 2.1–4.5)
POTASSIUM SERPL-SCNC: 4.4 MMOL/L (ref 3.5–5.3)
SODIUM BLD-SCNC: 143 MMOL/L (ref 133–146)
TRIGL SERPL-MCNC: 70 MG/DL (ref 10–149)

## 2019-01-11 ENCOUNTER — OFFICE VISIT (OUTPATIENT)
Dept: INTERNAL MEDICINE CLINIC | Age: 74
End: 2019-01-11
Payer: COMMERCIAL

## 2019-01-11 VITALS
DIASTOLIC BLOOD PRESSURE: 80 MMHG | WEIGHT: 187 LBS | HEIGHT: 67 IN | SYSTOLIC BLOOD PRESSURE: 128 MMHG | BODY MASS INDEX: 29.35 KG/M2

## 2019-01-11 DIAGNOSIS — I10 UNCONTROLLED HYPERTENSION: Primary | ICD-10-CM

## 2019-01-11 DIAGNOSIS — R73.9 HYPERGLYCEMIA: ICD-10-CM

## 2019-01-11 DIAGNOSIS — N18.2 CHRONIC KIDNEY DISEASE, STAGE II (MILD): Chronic | ICD-10-CM

## 2019-01-11 DIAGNOSIS — M17.0 PRIMARY OSTEOARTHRITIS OF BOTH KNEES: ICD-10-CM

## 2019-01-11 DIAGNOSIS — E66.09 CLASS 1 OBESITY DUE TO EXCESS CALORIES WITH SERIOUS COMORBIDITY AND BODY MASS INDEX (BMI) OF 30.0 TO 30.9 IN ADULT: ICD-10-CM

## 2019-01-11 DIAGNOSIS — I10 ESSENTIAL HYPERTENSION: ICD-10-CM

## 2019-01-11 PROCEDURE — 99214 OFFICE O/P EST MOD 30 MIN: CPT | Performed by: INTERNAL MEDICINE

## 2019-01-11 RX ORDER — MELOXICAM 15 MG/1
15 TABLET ORAL DAILY
Qty: 90 TABLET | Refills: 3 | Status: SHIPPED | OUTPATIENT
Start: 2019-01-11 | End: 2019-01-11

## 2019-01-11 ASSESSMENT — ENCOUNTER SYMPTOMS
WHEEZING: 0
ABDOMINAL PAIN: 0
COLOR CHANGE: 0
BACK PAIN: 0
CHEST TIGHTNESS: 0

## 2019-01-14 ENCOUNTER — TELEPHONE (OUTPATIENT)
Dept: INTERNAL MEDICINE CLINIC | Age: 74
End: 2019-01-14

## 2019-01-14 RX ORDER — OLMESARTAN MEDOXOMIL 20 MG/1
20 TABLET ORAL DAILY
Qty: 30 TABLET | Refills: 3 | Status: SHIPPED | OUTPATIENT
Start: 2019-01-14 | End: 2019-03-21 | Stop reason: SDUPTHER

## 2019-02-01 ENCOUNTER — OFFICE VISIT (OUTPATIENT)
Dept: INTERNAL MEDICINE CLINIC | Age: 74
End: 2019-02-01
Payer: COMMERCIAL

## 2019-02-01 VITALS
BODY MASS INDEX: 29.51 KG/M2 | HEIGHT: 67 IN | DIASTOLIC BLOOD PRESSURE: 80 MMHG | SYSTOLIC BLOOD PRESSURE: 122 MMHG | WEIGHT: 188 LBS

## 2019-02-01 DIAGNOSIS — R73.9 HYPERGLYCEMIA: ICD-10-CM

## 2019-02-01 DIAGNOSIS — N18.2 CHRONIC KIDNEY DISEASE, STAGE II (MILD): Chronic | ICD-10-CM

## 2019-02-01 DIAGNOSIS — E66.09 CLASS 1 OBESITY DUE TO EXCESS CALORIES WITH SERIOUS COMORBIDITY AND BODY MASS INDEX (BMI) OF 30.0 TO 30.9 IN ADULT: ICD-10-CM

## 2019-02-01 DIAGNOSIS — Z01.818 PREOP EXAM FOR INTERNAL MEDICINE: Primary | ICD-10-CM

## 2019-02-01 DIAGNOSIS — I10 ESSENTIAL HYPERTENSION: ICD-10-CM

## 2019-02-01 DIAGNOSIS — H25.9 AGE-RELATED CATARACT OF BOTH EYES, UNSPECIFIED AGE-RELATED CATARACT TYPE: ICD-10-CM

## 2019-02-01 DIAGNOSIS — J45.20 MILD INTERMITTENT ASTHMA WITHOUT COMPLICATION: ICD-10-CM

## 2019-02-01 DIAGNOSIS — J30.9 ALLERGIC RHINITIS, UNSPECIFIED SEASONALITY, UNSPECIFIED TRIGGER: ICD-10-CM

## 2019-02-01 PROCEDURE — 99244 OFF/OP CNSLTJ NEW/EST MOD 40: CPT | Performed by: INTERNAL MEDICINE

## 2019-02-01 ASSESSMENT — ENCOUNTER SYMPTOMS
COUGH: 1
CHEST TIGHTNESS: 0
WHEEZING: 0
SHORTNESS OF BREATH: 0
CHOKING: 0
BACK PAIN: 0
RHINORRHEA: 1
ABDOMINAL PAIN: 0
COLOR CHANGE: 0

## 2019-02-01 ASSESSMENT — PATIENT HEALTH QUESTIONNAIRE - PHQ9
2. FEELING DOWN, DEPRESSED OR HOPELESS: 0
SUM OF ALL RESPONSES TO PHQ QUESTIONS 1-9: 0
SUM OF ALL RESPONSES TO PHQ QUESTIONS 1-9: 0
SUM OF ALL RESPONSES TO PHQ9 QUESTIONS 1 & 2: 0
1. LITTLE INTEREST OR PLEASURE IN DOING THINGS: 0

## 2019-03-11 ENCOUNTER — TELEPHONE (OUTPATIENT)
Dept: INTERNAL MEDICINE CLINIC | Age: 74
End: 2019-03-11

## 2019-03-11 RX ORDER — AZITHROMYCIN 250 MG/1
TABLET, FILM COATED ORAL
Qty: 1 PACKET | Refills: 1 | Status: SHIPPED | OUTPATIENT
Start: 2019-03-11 | End: 2019-03-21

## 2019-03-21 RX ORDER — OLMESARTAN MEDOXOMIL 20 MG/1
20 TABLET ORAL DAILY
Qty: 30 TABLET | Refills: 3 | Status: SHIPPED | OUTPATIENT
Start: 2019-03-21 | End: 2019-08-11 | Stop reason: SDUPTHER

## 2019-04-01 LAB
ALBUMIN SERPL-MCNC: 4.2 G/DL (ref 3.5–5.7)
ANION GAP SERPL CALCULATED.3IONS-SCNC: 10 MMOL/L (ref 3–16)
BILIRUBIN URINE: NEGATIVE
BUN BLDV-MCNC: 28 MG/DL (ref 7–25)
CALCIUM SERPL-MCNC: 10 MG/DL (ref 8.6–10.3)
CHLORIDE BLD-SCNC: 108 MMOL/L (ref 98–110)
CLARITY: ABNORMAL
CO2: 27 MMOL/L (ref 21–33)
COLOR: YELLOW
CREAT SERPL-MCNC: 1.45 MG/DL (ref 0.6–1.3)
CREATININE URINE: 174.4 MG/DL
EPITHELIAL CELLS, UA: 14 /HPF (ref 0–5)
ERYTHROCYTES URINE: NEGATIVE
GFR, ESTIMATED: 36 SEE NOTE.
GFR, ESTIMATED: 42 SEE NOTE.
GLUCOSE BLD-MCNC: 94 MG/DL (ref 70–100)
GLUCOSE URINE: NEGATIVE MG/DL
HBA1C MFR BLD: 5.9 % (ref 4.8–6.4)
HYALINE CASTS: 5 /LPF (ref 0–2)
KETONES, URINE: NEGATIVE MG/DL
LEUKOCYTE ESTERASE, URINE: ABNORMAL
LEUKOCYTES, UA: 34 /HPF (ref 0–5)
MICROALBUMIN UR-MCNC: 48.8 MG/L (ref 0–17)
MICROALBUMIN/CREAT UR-RTO: 28 MG/G (ref 0–30)
MUCUS: PRESENT /HPF
NITRITE, URINE: NEGATIVE
OSMOLALITY CALCULATION: 305 MOSM/KG (ref 278–305)
PHOSPHORUS: 3.7 MG/DL (ref 2.1–4.5)
POC PH ARTERIAL: 6 (ref 5–8)
POTASSIUM SERPL-SCNC: 4.7 MMOL/L (ref 3.5–5.3)
PROTEIN UA: 100 MG/DL
RBC UA: 1 /HPF (ref 0–3)
SODIUM BLD-SCNC: 145 MMOL/L (ref 133–146)
SPECIFIC GRAVITY UA: 1.02 (ref 1–1.03)
UROBILINOGEN, URINE: <2 MG/DL (ref 0.2–1.9)

## 2019-04-02 ENCOUNTER — OFFICE VISIT (OUTPATIENT)
Dept: INTERNAL MEDICINE CLINIC | Age: 74
End: 2019-04-02
Payer: COMMERCIAL

## 2019-04-02 VITALS — WEIGHT: 186 LBS | SYSTOLIC BLOOD PRESSURE: 134 MMHG | DIASTOLIC BLOOD PRESSURE: 84 MMHG | BODY MASS INDEX: 29.13 KG/M2

## 2019-04-02 DIAGNOSIS — E66.09 CLASS 1 OBESITY DUE TO EXCESS CALORIES WITH SERIOUS COMORBIDITY AND BODY MASS INDEX (BMI) OF 30.0 TO 30.9 IN ADULT: ICD-10-CM

## 2019-04-02 DIAGNOSIS — J45.20 MILD INTERMITTENT ASTHMA WITHOUT COMPLICATION: ICD-10-CM

## 2019-04-02 DIAGNOSIS — R73.9 HYPERGLYCEMIA: ICD-10-CM

## 2019-04-02 DIAGNOSIS — N18.2 CHRONIC KIDNEY DISEASE, STAGE II (MILD): Chronic | ICD-10-CM

## 2019-04-02 DIAGNOSIS — I10 ESSENTIAL HYPERTENSION: ICD-10-CM

## 2019-04-02 DIAGNOSIS — H25.9 AGE-RELATED CATARACT OF BOTH EYES, UNSPECIFIED AGE-RELATED CATARACT TYPE: ICD-10-CM

## 2019-04-02 PROCEDURE — 99214 OFFICE O/P EST MOD 30 MIN: CPT | Performed by: INTERNAL MEDICINE

## 2019-04-02 ASSESSMENT — PATIENT HEALTH QUESTIONNAIRE - PHQ9
2. FEELING DOWN, DEPRESSED OR HOPELESS: 0
1. LITTLE INTEREST OR PLEASURE IN DOING THINGS: 0
SUM OF ALL RESPONSES TO PHQ9 QUESTIONS 1 & 2: 0
SUM OF ALL RESPONSES TO PHQ QUESTIONS 1-9: 0
SUM OF ALL RESPONSES TO PHQ QUESTIONS 1-9: 0

## 2019-04-02 ASSESSMENT — ENCOUNTER SYMPTOMS
CHEST TIGHTNESS: 0
BACK PAIN: 0
ABDOMINAL PAIN: 0
WHEEZING: 0
COLOR CHANGE: 0

## 2019-04-02 NOTE — PROGRESS NOTES
10/18/2017    Pneumococcal 13-valent Conjugate ([id number]) 02/18/2016    Pneumococcal Polysaccharide ([id number]) 02/13/2014    Tdap (Boostrix, Adacel) 02/13/2014    Zoster Live (Zostavax) 09/28/2016       Past Medical History:   Diagnosis Date    Allergic rhinitis, cause unspecified 8/23/2010    Asymptomatic varicose veins 8/23/2010    Calcium oxalate renal stones     Chronic kidney disease, stage II (mild) 8/23/2010    ? due to stones    Injury of right hand 10/13/2015    Torn ligament and carpal tunnel  Dr. Lowell Mcmillan at [address] [place] [address] stone on right side 11/11/2011    Leiomyoma of uterus, unspecified 8/23/2010    Obesity, unspecified 8/23/2010    Pain in limb 8/23/2010    Pap smear 7/16/2009    Negative    S/P colonoscopy 10/11/2006    Normal    Screening mammogram 8/4/2009    (Both) Negative    Unspecified asthma(493.90) 8/23/2010    Unspecified essential hypertension 8/23/2010     Past Surgical History:   Procedure Laterality Date    COLONOSCOPY  07/05/2016    LITHOTRIPSY      x2 last 1/2011    TOTAL NEPHRECTOMY      4/2013 right due to chronic infection     Family History   Problem Relation Age of Onset    Hypertension Mother     Hypertension Father     Stroke Father     Heart Disease Sister     Prostate Cancer Brother      Social History     Socioeconomic History    Marital status:      Spouse name: Not on file    Number of children: Not on file    Years of education: Not on file    Highest education level: Not on file   Occupational History    Not on file   Social Needs    Financial resource strain: Not on file    Food insecurity:     Worry: Not on file     Inability: Not on file    Transportation needs:     Medical: Not on file     Non-medical: Not on file   Tobacco Use    Smoking status: Never Smoker    Smokeless tobacco: Never Used   Substance and Sexual Activity    Alcohol use: No    Drug use: No    Sexual activity: Not on file   Lifestyle    Physical activity:     Days per week: Not on file     Minutes per session: Not on file    Stress: Not on file   Relationships    Social connections:     Talks on phone: Not on file     Gets together: Not on file     Attends [employer] service: Not on file     Active member of club or organization: Not on file     Attends meetings of clubs or organizations: Not on file     Relationship status: Not on file    Intimate partner violence:     Fear of current or ex partner: Not on file     Emotionally abused: Not on file     Physically abused: Not on file     Forced sexual activity: Not on file   Other Topics Concern    Not on file   Social History Narrative    Not on file             Review of Systems   Constitutional: Negative for activity change, appetite change and fatigue. HENT: Negative for congestion. Eyes: Positive for visual disturbance. Respiratory: Negative for chest tightness and wheezing. Cardiovascular: Negative for chest pain and palpitations. Gastrointestinal: Negative for abdominal pain. Musculoskeletal: Negative for arthralgias and back pain. Skin: Negative for color change and rash. Neurological: Negative for weakness, light-headedness and headaches. Psychiatric/Behavioral: Negative for dysphoric mood and sleep disturbance. The patient is not nervous/anxious. Objective:   Physical Exam   Constitutional: She is oriented to person, place, and time. She appears well-developed and well-nourished. HENT:   Head: Normocephalic and atraumatic. Mouth/Throat: Oropharynx is clear and moist. No oropharyngeal exudate. Eyes: Pupils are equal, round, and reactive to light. Conjunctivae and EOM are normal.   Neck: Normal range of motion. Neck supple. Cardiovascular: Normal rate, regular rhythm and normal heart sounds. Pulmonary/Chest: Effort normal and breath sounds normal. No stridor. No respiratory distress. She has no wheezes. Abdominal: She exhibits no distension.  There is no tenderness. Lymphadenopathy:     She has no cervical adenopathy. Neurological: She is alert and oriented to person, place, and time. Skin: Skin is warm and dry. Psychiatric: She has a normal mood and affect. Her behavior is normal. Judgment and thought content normal.         Assessment and Plan:      Chronic kidney disease, stage II (mild)  For futher evaluation and treatment per Dr. Mercedez Wolff current regimen and f/u with pulmonology     Age-related cataract of both eyes  For repair on left    Class 1 obesity due to excess calories with serious comorbidity and body mass index (BMI) of 30.0 to 30.9 in adult  Cont to work on diet     Essential hypertension  Controlled w current regimen- continue and monitor closely      Hyperglycemia  Controlled w current regimen-cont to follow diet carefully         Patient has been seen-history and physical performed and is medically cleared for surgery.

## 2019-04-02 NOTE — LETTER
[facility] [address]  [address] [address]  Phone: [phone]  Fax: [phone]    Dr. Birgit Greene        April 2, 2019       Patient: Brigitte Ball   MR Number: [id number]   YOB: 1945   Date of Visit: 4/2/2019       Dear Dr. Birgit Greene: Thank you for the request for consultation for True Bai to me for preoperative evaluation prior to cataract surgery of left eye. Below are the relevant portions of my assessment and plan of care. Subjective:      Patient ID: Brigitte Ball is a 72 y.o. female. Chief Complaint   Patient presents with    Pre-op Exam     left eye surgery 4/9/19 [facility]       IN FOR PREOP prior to left eye cataract surgery. Is following up with Dr. Caprice Jade re: her proteinuria and kidney issues. BP's have been well controlled. Sugars also better w glyco down. Asthma well controlled w current regimen. Brigitte Ball  1945    Allergies   Allergen Reactions    Amoxicillin     Sulfa Antibiotics      Current Outpatient Medications   Medication Sig Dispense Refill    loratadine (CLARITIN) 10 MG tablet Take 10 mg by mouth daily      olmesartan (BENICAR) 20 MG tablet Take 1 tablet by mouth daily 30 tablet 3    cloNIDine (CATAPRES) 0.1 MG tablet Take 1 tablet by mouth 3 times daily Note change in sig to tid 270 tablet 3    verapamil (CALAN SR) 240 MG extended release tablet TAKE ONE TABLET BY MOUTH DAILY 90 tablet 3    albuterol sulfate  (90 Base) MCG/ACT inhaler Inhale 1 puff into the lungs 2 times daily      montelukast (SINGULAIR) 10 MG tablet Take 1 tablet by mouth nightly 90 tablet 3    Cholecalciferol (VITAMIN D) 2000 UNITS TABS Take 1 tablet by mouth daily 90 tablet 3    fluticasone (FLONASE) 50 MCG/ACT nasal spray 1 spray by Nasal route daily 16 g 12     No current facility-administered medications for this visit.         Vitals:    04/02/19 1556 04/02/19 1557   BP: 134/84 134/84 Weight: 186 lb (84.4 kg)      Body mass index is 29.13 kg/m².      Wt Readings from Last 3 Encounters:   04/02/19 186 lb (84.4 kg)   03/26/19 185 lb 4.8 oz (84.1 kg)   02/01/19 188 lb (85.3 kg)     BP Readings from Last 3 Encounters:   04/02/19 134/84   03/26/19 (!) 154/91   02/01/19 122/80         Immunization History   Administered Date(s) Administered    DTaP 08/05/2003    Influenza Vaccine, unspecified formulation 10/20/2016, 10/17/2018    Influenza, High Dose (Fluzone 65 yrs and older) 09/30/2011, 10/13/2015, 10/18/2017    Pneumococcal 13-valent Conjugate ([id number]) 02/18/2016    Pneumococcal Polysaccharide ([id number]) 02/13/2014    Tdap (Boostrix, Adacel) 02/13/2014    Zoster Live (Zostavax) 09/28/2016       Past Medical History:   Diagnosis Date    Allergic rhinitis, cause unspecified 8/23/2010    Asymptomatic varicose veins 8/23/2010    Calcium oxalate renal stones     Chronic kidney disease, stage II (mild) 8/23/2010    ? due to stones    Injury of right hand 10/13/2015    Torn ligament and carpal tunnel  Dr. 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     Family History   Problem Relation Age of Onset    Hypertension Mother     Hypertension Father     Stroke Father     Heart Disease Sister     Prostate Cancer Brother      Social History     Socioeconomic History    Marital status:      Spouse name: Not on file    Number of children: Not on file  Years of education: Not on file    Highest education level: Not on file   Occupational History    Not on file   Social Needs    Financial resource strain: Not on file    Food insecurity:     Worry: Not on file     Inability: Not on file    Transportation needs:     Medical: Not on file     Non-medical: Not on file   Tobacco Use    Smoking status: Never Smoker    Smokeless tobacco: Never Used   Substance and Sexual Activity    Alcohol use: No    Drug use: No    Sexual activity: Not on file   Lifestyle    Physical activity:     Days per week: Not on file     Minutes per session: Not on file    Stress: Not on file   Relationships    Social connections:     Talks on phone: Not on file     Gets together: Not on file     Attends Hoahaoism service: Not on file     Active member of club or organization: Not on file     Attends meetings of clubs or organizations: Not on file     Relationship status: Not on file    Intimate partner violence:     Fear of current or ex partner: Not on file     Emotionally abused: Not on file     Physically abused: Not on file     Forced sexual activity: Not on file   Other Topics Concern    Not on file   Social History Narrative    Not on file             Review of Systems   Constitutional: Negative for activity change, appetite change and fatigue. HENT: Negative for congestion. Eyes: Positive for visual disturbance. Respiratory: Negative for chest tightness and wheezing. Cardiovascular: Negative for chest pain and palpitations. Gastrointestinal: Negative for abdominal pain. Musculoskeletal: Negative for arthralgias and back pain. Skin: Negative for color change and rash. Neurological: Negative for weakness, light-headedness and headaches. Psychiatric/Behavioral: Negative for dysphoric mood and sleep disturbance. The patient is not nervous/anxious. Objective:   Physical Exam   Constitutional: She is oriented to person, place, and time.  She appears well-developed and well-nourished. HENT:   Head: Normocephalic and atraumatic. Mouth/Throat: Oropharynx is clear and moist. No oropharyngeal exudate. Eyes: Pupils are equal, round, and reactive to light. Conjunctivae and EOM are normal.   Neck: Normal range of motion. Neck supple. Cardiovascular: Normal rate, regular rhythm and normal heart sounds. Pulmonary/Chest: Effort normal and breath sounds normal. No stridor. No respiratory distress. She has no wheezes. Abdominal: She exhibits no distension. There is no tenderness. Lymphadenopathy:     She has no cervical adenopathy. Neurological: She is alert and oriented to person, place, and time. Skin: Skin is warm and dry. Psychiatric: She has a normal mood and affect. Her behavior is normal. Judgment and thought content normal.         Assessment and Plan:      Chronic kidney disease, stage II (mild)  For futher evaluation and treatment per Dr. Franchesca Rosales current regimen and f/u with pulmonology     Age-related cataract of both eyes  For repair on left    Class 1 obesity due to excess calories with serious comorbidity and body mass index (BMI) of 30.0 to 30.9 in adult  Cont to work on diet     Essential hypertension  Controlled w current regimen- continue and monitor closely      Hyperglycemia  Controlled w current regimen-cont to follow diet carefully         Patient has been seen-history and physical performed and is medically cleared for surgery. If you have questions, please do not hesitate to call me. I look forward to following Nina Barbosa along with you.     Sincerely,        Lexa Eugene MD    CC providers:  No Recipients

## 2019-04-02 NOTE — COMMUNICATION BODY
Subjective:      Patient ID: Hetla Andrew is a 72 y.o. female. Chief Complaint   Patient presents with    Pre-op Exam     left eye surgery 4/9/19 [facility]       IN FOR PREOP prior to left eye cataract surgery. Is following up with Dr. Houston Keith re: her proteinuria and kidney issues. BP's have been well controlled. Sugars also better w glyco down. Asthma well controlled w current regimen. Hetal Andrew  1945    Allergies   Allergen Reactions    Amoxicillin     Sulfa Antibiotics      Current Outpatient Medications   Medication Sig Dispense Refill    loratadine (CLARITIN) 10 MG tablet Take 10 mg by mouth daily      olmesartan (BENICAR) 20 MG tablet Take 1 tablet by mouth daily 30 tablet 3    cloNIDine (CATAPRES) 0.1 MG tablet Take 1 tablet by mouth 3 times daily Note change in sig to tid 270 tablet 3    verapamil (CALAN SR) 240 MG extended release tablet TAKE ONE TABLET BY MOUTH DAILY 90 tablet 3    albuterol sulfate  (90 Base) MCG/ACT inhaler Inhale 1 puff into the lungs 2 times daily      montelukast (SINGULAIR) 10 MG tablet Take 1 tablet by mouth nightly 90 tablet 3    Cholecalciferol (VITAMIN D) 2000 UNITS TABS Take 1 tablet by mouth daily 90 tablet 3    fluticasone (FLONASE) 50 MCG/ACT nasal spray 1 spray by Nasal route daily 16 g 12     No current facility-administered medications for this visit. Vitals:    04/02/19 1556 04/02/19 1557   BP: 134/84 134/84   Weight: 186 lb (84.4 kg)      Body mass index is 29.13 kg/m².      Wt Readings from Last 3 Encounters:   04/02/19 186 lb (84.4 kg)   03/26/19 185 lb 4.8 oz (84.1 kg)   02/01/19 188 lb (85.3 kg)     BP Readings from Last 3 Encounters:   04/02/19 134/84   03/26/19 (!) 154/91   02/01/19 122/80         Immunization History   Administered Date(s) Administered    DTaP 08/05/2003    Influenza Vaccine, unspecified formulation 10/20/2016, 10/17/2018    Influenza, High Dose (Fluzone 65 yrs and older) 09/30/2011, 10/13/2015, 10/18/2017    Pneumococcal 13-valent Conjugate ([id number]) 02/18/2016    Pneumococcal Polysaccharide ([id number]) 02/13/2014    Tdap (Boostrix, Adacel) 02/13/2014    Zoster Live (Zostavax) 09/28/2016       Past Medical History:   Diagnosis Date    Allergic rhinitis, cause unspecified 8/23/2010    Asymptomatic varicose veins 8/23/2010    Calcium oxalate renal stones     Chronic kidney disease, stage II (mild) 8/23/2010    ? due to stones    Injury of right hand 10/13/2015    Torn ligament and carpal tunnel  Dr. Luca Rios at [address] [place] [address] stone on right side 11/11/2011    Leiomyoma of uterus, unspecified 8/23/2010    Obesity, unspecified 8/23/2010    Pain in limb 8/23/2010    Pap smear 7/16/2009    Negative    S/P colonoscopy 10/11/2006    Normal    Screening mammogram 8/4/2009    (Both) Negative    Unspecified asthma(493.90) 8/23/2010    Unspecified essential hypertension 8/23/2010     Past Surgical History:   Procedure Laterality Date    COLONOSCOPY  07/05/2016    LITHOTRIPSY      x2 last 1/2011    TOTAL NEPHRECTOMY      4/2013 right due to chronic infection     Family History   Problem Relation Age of Onset    Hypertension Mother     Hypertension Father     Stroke Father     Heart Disease Sister     Prostate Cancer Brother      Social History     Socioeconomic History    Marital status:      Spouse name: Not on file    Number of children: Not on file    Years of education: Not on file    Highest education level: Not on file   Occupational History    Not on file   Social Needs    Financial resource strain: Not on file    Food insecurity:     Worry: Not on file     Inability: Not on file    Transportation needs:     Medical: Not on file     Non-medical: Not on file   Tobacco Use    Smoking status: Never Smoker    Smokeless tobacco: Never Used   Substance and Sexual Activity    Alcohol use: No    Drug use: No    Sexual activity: Not on file   Lifestyle    Physical activity:     Days per week: Not on file     Minutes per session: Not on file    Stress: Not on file   Relationships    Social connections:     Talks on phone: Not on file     Gets together: Not on file     Attends Uatsdin service: Not on file     Active member of club or organization: Not on file     Attends meetings of clubs or organizations: Not on file     Relationship status: Not on file    Intimate partner violence:     Fear of current or ex partner: Not on file     Emotionally abused: Not on file     Physically abused: Not on file     Forced sexual activity: Not on file   Other Topics Concern    Not on file   Social History Narrative    Not on file             Review of Systems   Constitutional: Negative for activity change, appetite change and fatigue. HENT: Negative for congestion. Eyes: Positive for visual disturbance. Respiratory: Negative for chest tightness and wheezing. Cardiovascular: Negative for chest pain and palpitations. Gastrointestinal: Negative for abdominal pain. Musculoskeletal: Negative for arthralgias and back pain. Skin: Negative for color change and rash. Neurological: Negative for weakness, light-headedness and headaches. Psychiatric/Behavioral: Negative for dysphoric mood and sleep disturbance. The patient is not nervous/anxious. Objective:   Physical Exam   Constitutional: She is oriented to person, place, and time. She appears well-developed and well-nourished. HENT:   Head: Normocephalic and atraumatic. Mouth/Throat: Oropharynx is clear and moist. No oropharyngeal exudate. Eyes: Pupils are equal, round, and reactive to light. Conjunctivae and EOM are normal.   Neck: Normal range of motion. Neck supple. Cardiovascular: Normal rate, regular rhythm and normal heart sounds. Pulmonary/Chest: Effort normal and breath sounds normal. No stridor. No respiratory distress. She has no wheezes. Abdominal: She exhibits no distension.  There is no

## 2019-04-04 LAB — RENIN PLASMA: 0.27 NG/ML/HR (ref 0.17–5.38)

## 2019-04-16 ENCOUNTER — TELEPHONE (OUTPATIENT)
Dept: INTERNAL MEDICINE CLINIC | Age: 74
End: 2019-04-16

## 2019-04-16 RX ORDER — CIPROFLOXACIN 250 MG/1
250 TABLET, FILM COATED ORAL 2 TIMES DAILY
Qty: 20 TABLET | Refills: 0 | Status: SHIPPED | OUTPATIENT
Start: 2019-04-16 | End: 2019-04-26

## 2019-04-16 NOTE — TELEPHONE ENCOUNTER
Pt did not want to disclose what the call was regarding all pt inform was she had questions and concerns regarding a medication

## 2019-06-24 ENCOUNTER — TELEPHONE (OUTPATIENT)
Dept: INTERNAL MEDICINE CLINIC | Age: 74
End: 2019-06-24

## 2019-06-24 RX ORDER — DILTIAZEM HYDROCHLORIDE 240 MG/1
240 CAPSULE, COATED, EXTENDED RELEASE ORAL DAILY
Qty: 30 CAPSULE | Refills: 3 | Status: SHIPPED | OUTPATIENT
Start: 2019-06-24 | End: 2019-10-18 | Stop reason: SDUPTHER

## 2019-06-29 ENCOUNTER — OFFICE VISIT (OUTPATIENT)
Dept: INTERNAL MEDICINE CLINIC | Age: 74
End: 2019-06-29
Payer: COMMERCIAL

## 2019-06-29 VITALS
BODY MASS INDEX: 29.77 KG/M2 | OXYGEN SATURATION: 98 % | WEIGHT: 190.1 LBS | SYSTOLIC BLOOD PRESSURE: 134 MMHG | HEART RATE: 83 BPM | DIASTOLIC BLOOD PRESSURE: 84 MMHG

## 2019-06-29 DIAGNOSIS — M25.562 CHRONIC PAIN OF BOTH KNEES: ICD-10-CM

## 2019-06-29 DIAGNOSIS — J45.20 MILD INTERMITTENT ASTHMA WITHOUT COMPLICATION: ICD-10-CM

## 2019-06-29 DIAGNOSIS — J45.909 BRONCHITIS WITH ASTHMA, ACUTE: ICD-10-CM

## 2019-06-29 DIAGNOSIS — M25.561 CHRONIC PAIN OF BOTH KNEES: ICD-10-CM

## 2019-06-29 DIAGNOSIS — G89.29 CHRONIC PAIN OF BOTH KNEES: ICD-10-CM

## 2019-06-29 DIAGNOSIS — J20.9 BRONCHITIS WITH ASTHMA, ACUTE: ICD-10-CM

## 2019-06-29 DIAGNOSIS — E66.09 CLASS 1 OBESITY DUE TO EXCESS CALORIES WITH SERIOUS COMORBIDITY AND BODY MASS INDEX (BMI) OF 30.0 TO 30.9 IN ADULT: ICD-10-CM

## 2019-06-29 PROCEDURE — 99214 OFFICE O/P EST MOD 30 MIN: CPT | Performed by: INTERNAL MEDICINE

## 2019-06-29 RX ORDER — PREDNISONE 20 MG/1
20 TABLET ORAL DAILY
Qty: 7 TABLET | Refills: 0 | Status: SHIPPED | OUTPATIENT
Start: 2019-06-29 | End: 2019-07-06

## 2019-06-29 RX ORDER — BUDESONIDE AND FORMOTEROL FUMARATE DIHYDRATE 160; 4.5 UG/1; UG/1
2 AEROSOL RESPIRATORY (INHALATION) 2 TIMES DAILY
Qty: 1 INHALER | Refills: 3
Start: 2019-06-29 | End: 2020-01-13 | Stop reason: SDUPTHER

## 2019-06-29 RX ORDER — AZITHROMYCIN 250 MG/1
TABLET, FILM COATED ORAL
Qty: 1 PACKET | Refills: 1 | Status: SHIPPED | OUTPATIENT
Start: 2019-06-29 | End: 2019-07-09

## 2019-06-29 RX ORDER — PROMETHAZINE HYDROCHLORIDE AND CODEINE PHOSPHATE 6.25; 1 MG/5ML; MG/5ML
5 SYRUP ORAL EVERY 4 HOURS PRN
Qty: 240 ML | Refills: 1 | Status: SHIPPED | OUTPATIENT
Start: 2019-06-29 | End: 2020-05-12 | Stop reason: ALTCHOICE

## 2019-06-29 ASSESSMENT — ENCOUNTER SYMPTOMS
CHEST TIGHTNESS: 0
BACK PAIN: 0
ABDOMINAL PAIN: 0
RHINORRHEA: 1
COUGH: 1
COLOR CHANGE: 0
WHEEZING: 1

## 2019-06-29 NOTE — PROGRESS NOTES
Subjective:      Patient ID: Chinyere Soto is a 72 y.o. female. Chief Complaint   Patient presents with    Cough     x 2 weeks, cough, with yellow mucus, no notice of any fever, has concerns    Congestion     Cough persisting after 2 weeks-some wheezing-no fever-++ productive of green sputum- bp's well controlled - still struggling w weight-no recent renal stones-knees doing well even off meloxicam-trying to do her exercises regularly -sl flare of asthma w the uri but taking singulair and symbicort regularly    Review of Systems   Constitutional: Negative for activity change, appetite change and fatigue. HENT: Positive for congestion and rhinorrhea. Eyes: Negative for visual disturbance. Respiratory: Positive for cough and wheezing. Negative for chest tightness. Cardiovascular: Negative for chest pain and palpitations. Gastrointestinal: Negative for abdominal pain. Musculoskeletal: Positive for arthralgias and myalgias. Negative for back pain. Skin: Negative for color change and rash. Neurological: Negative for weakness, light-headedness and headaches. Objective:   Physical Exam   Constitutional: She is oriented to person, place, and time. She appears well-developed and well-nourished. HENT:   Head: Normocephalic and atraumatic. Eyes: Pupils are equal, round, and reactive to light. Conjunctivae and EOM are normal.   Neck: Normal range of motion. Neck supple. Cardiovascular: Normal rate, regular rhythm and normal heart sounds. Pulmonary/Chest: Effort normal. No respiratory distress. She has wheezes. Abdominal: She exhibits no distension. There is no tenderness. Musculoskeletal: She exhibits tenderness. Decreased rom in knee without effusion,redness or warmth noted  ++ pain w rom      Lymphadenopathy:     She has no cervical adenopathy. Neurological: She is alert and oriented to person, place, and time. Skin: Skin is warm and dry.    Psychiatric: She has a normal mood

## 2019-08-13 RX ORDER — OLMESARTAN MEDOXOMIL 20 MG/1
TABLET ORAL
Qty: 30 TABLET | Refills: 2 | Status: SHIPPED | OUTPATIENT
Start: 2019-08-13 | End: 2019-10-22 | Stop reason: SDUPTHER

## 2019-10-21 RX ORDER — DILTIAZEM HYDROCHLORIDE 240 MG/1
CAPSULE, EXTENDED RELEASE ORAL
Qty: 30 CAPSULE | Refills: 2 | Status: SHIPPED | OUTPATIENT
Start: 2019-10-21 | End: 2019-10-22 | Stop reason: SDUPTHER

## 2019-10-24 RX ORDER — DILTIAZEM HYDROCHLORIDE 240 MG/1
240 CAPSULE, EXTENDED RELEASE ORAL DAILY
Qty: 30 CAPSULE | Refills: 2 | Status: SHIPPED | OUTPATIENT
Start: 2019-10-24 | End: 2020-01-13 | Stop reason: SDUPTHER

## 2019-10-24 RX ORDER — OLMESARTAN MEDOXOMIL 20 MG/1
20 TABLET ORAL DAILY
Qty: 30 TABLET | Refills: 2 | Status: SHIPPED | OUTPATIENT
Start: 2019-10-24 | End: 2019-12-19 | Stop reason: SDUPTHER

## 2019-10-24 RX ORDER — MONTELUKAST SODIUM 10 MG/1
10 TABLET ORAL NIGHTLY
Qty: 90 TABLET | Refills: 3 | Status: SHIPPED | OUTPATIENT
Start: 2019-10-24 | End: 2022-03-17

## 2020-01-03 ENCOUNTER — TELEPHONE (OUTPATIENT)
Dept: INTERNAL MEDICINE CLINIC | Age: 75
End: 2020-01-03

## 2020-01-03 RX ORDER — FAMOTIDINE 20 MG/1
20 TABLET, FILM COATED ORAL 2 TIMES DAILY
Qty: 180 TABLET | Refills: 1 | Status: SHIPPED
Start: 2020-01-03 | End: 2020-06-09 | Stop reason: SDUPTHER

## 2020-01-13 ENCOUNTER — OFFICE VISIT (OUTPATIENT)
Dept: INTERNAL MEDICINE CLINIC | Age: 75
End: 2020-01-13
Payer: COMMERCIAL

## 2020-01-13 VITALS
SYSTOLIC BLOOD PRESSURE: 140 MMHG | WEIGHT: 193 LBS | HEIGHT: 67 IN | BODY MASS INDEX: 30.29 KG/M2 | DIASTOLIC BLOOD PRESSURE: 80 MMHG

## 2020-01-13 PROBLEM — M25.562 BILATERAL KNEE PAIN: Status: RESOLVED | Noted: 2018-12-11 | Resolved: 2020-01-13

## 2020-01-13 PROBLEM — J45.909 BRONCHITIS WITH ASTHMA, ACUTE: Status: RESOLVED | Noted: 2018-08-15 | Resolved: 2020-01-13

## 2020-01-13 PROBLEM — J20.9 BRONCHITIS WITH ASTHMA, ACUTE: Status: RESOLVED | Noted: 2018-08-15 | Resolved: 2020-01-13

## 2020-01-13 PROBLEM — M25.561 BILATERAL KNEE PAIN: Status: RESOLVED | Noted: 2018-12-11 | Resolved: 2020-01-13

## 2020-01-13 PROCEDURE — 99397 PER PM REEVAL EST PAT 65+ YR: CPT | Performed by: INTERNAL MEDICINE

## 2020-01-13 RX ORDER — BUDESONIDE AND FORMOTEROL FUMARATE DIHYDRATE 160; 4.5 UG/1; UG/1
2 AEROSOL RESPIRATORY (INHALATION) 2 TIMES DAILY
Qty: 3 INHALER | Refills: 5 | Status: SHIPPED | OUTPATIENT
Start: 2020-01-13 | End: 2020-09-16 | Stop reason: SDUPTHER

## 2020-01-13 RX ORDER — PREDNISONE 20 MG/1
40 TABLET ORAL DAILY
Qty: 14 TABLET | Refills: 0 | Status: SHIPPED | OUTPATIENT
Start: 2020-01-13 | End: 2020-01-20

## 2020-01-13 RX ORDER — DILTIAZEM HYDROCHLORIDE 240 MG/1
240 CAPSULE, EXTENDED RELEASE ORAL DAILY
Qty: 90 CAPSULE | Refills: 5 | Status: SHIPPED
Start: 2020-01-13 | End: 2020-05-15 | Stop reason: SDUPTHER

## 2020-01-13 RX ORDER — ALBUTEROL SULFATE 90 UG/1
AEROSOL, METERED RESPIRATORY (INHALATION)
Qty: 2 INHALER | Refills: 4 | Status: SHIPPED | OUTPATIENT
Start: 2020-01-13 | End: 2020-09-16 | Stop reason: SDUPTHER

## 2020-01-13 RX ORDER — PROMETHAZINE HYDROCHLORIDE AND CODEINE PHOSPHATE 6.25; 1 MG/5ML; MG/5ML
5 SYRUP ORAL EVERY 4 HOURS PRN
Qty: 240 ML | Refills: 1 | Status: SHIPPED | OUTPATIENT
Start: 2020-01-13 | End: 2020-05-12 | Stop reason: ALTCHOICE

## 2020-01-13 RX ORDER — AZITHROMYCIN 250 MG/1
TABLET, FILM COATED ORAL
Qty: 1 PACKET | Refills: 0 | Status: SHIPPED | OUTPATIENT
Start: 2020-01-13 | End: 2020-01-23

## 2020-01-13 SDOH — ECONOMIC STABILITY: FOOD INSECURITY: WITHIN THE PAST 12 MONTHS, THE FOOD YOU BOUGHT JUST DIDN'T LAST AND YOU DIDN'T HAVE MONEY TO GET MORE.: PATIENT DECLINED

## 2020-01-13 SDOH — ECONOMIC STABILITY: TRANSPORTATION INSECURITY
IN THE PAST 12 MONTHS, HAS LACK OF TRANSPORTATION KEPT YOU FROM MEETINGS, WORK, OR FROM GETTING THINGS NEEDED FOR DAILY LIVING?: PATIENT DECLINED

## 2020-01-13 SDOH — ECONOMIC STABILITY: TRANSPORTATION INSECURITY
IN THE PAST 12 MONTHS, HAS THE LACK OF TRANSPORTATION KEPT YOU FROM MEDICAL APPOINTMENTS OR FROM GETTING MEDICATIONS?: PATIENT DECLINED

## 2020-01-13 SDOH — ECONOMIC STABILITY: FOOD INSECURITY: WITHIN THE PAST 12 MONTHS, YOU WORRIED THAT YOUR FOOD WOULD RUN OUT BEFORE YOU GOT MONEY TO BUY MORE.: PATIENT DECLINED

## 2020-01-13 ASSESSMENT — ENCOUNTER SYMPTOMS
WHEEZING: 1
SHORTNESS OF BREATH: 0
CHEST TIGHTNESS: 0
BACK PAIN: 0
ABDOMINAL PAIN: 0
COLOR CHANGE: 0
COUGH: 1

## 2020-01-13 ASSESSMENT — PATIENT HEALTH QUESTIONNAIRE - PHQ9
2. FEELING DOWN, DEPRESSED OR HOPELESS: 0
SUM OF ALL RESPONSES TO PHQ9 QUESTIONS 1 & 2: 0
1. LITTLE INTEREST OR PLEASURE IN DOING THINGS: 0
SUM OF ALL RESPONSES TO PHQ QUESTIONS 1-9: 0
SUM OF ALL RESPONSES TO PHQ QUESTIONS 1-9: 0

## 2020-01-13 NOTE — PATIENT INSTRUCTIONS
If you are worse or at least in another 3 days, try the antibiotic and prednisone  Get your 2nd shingrix vaccine

## 2020-01-13 NOTE — PROGRESS NOTES
Subjective:      Patient ID: Melissa Saavedra is a 69 y.o. female. Chief Complaint   Patient presents with    Annual Exam     yearly/refill needed,sinus drainge yellowish x 5 -6 days       Doing well but has had a uri for 4 days w dark phlegm- was in ER then admitted for GI infection after a cruise-no known etiology- bp sl up today w coughing- no progress w wt loss-asthma sl flared w URI     Melissa Saavedra  1945    Allergies   Allergen Reactions    Nsaids      Contraindicated w renal disease     Amoxicillin     Sulfa Antibiotics      Current Outpatient Medications   Medication Sig Dispense Refill    azithromycin (ZITHROMAX) 250 MG tablet As directed 1 packet 0    promethazine-codeine (PHENERGAN WITH CODEINE) 6.25-10 MG/5ML syrup Take 5 mLs by mouth every 4 hours as needed for Cough. 240 mL 1    predniSONE (DELTASONE) 20 MG tablet Take 2 tablets by mouth daily for 7 days 14 tablet 0    cloNIDine (CATAPRES) 0.1 MG tablet TAKE ONE TABLET BY MOUTH THREE TIMES A DAY 90 tablet 0    famotidine (PEPCID) 20 MG tablet Take 1 tablet by mouth 2 times daily 180 tablet 1    olmesartan (BENICAR) 40 MG tablet Take 1 tablet by mouth daily 30 tablet 5    montelukast (SINGULAIR) 10 MG tablet Take 1 tablet by mouth nightly 90 tablet 3    diltiazem (TIAZAC) 240 MG extended release capsule Take 1 capsule by mouth daily 30 capsule 2    PROAIR  (90 Base) MCG/ACT inhaler INHALE TWO PUFFS BY MOUTH THREE TIMES A DAY AS NEEDED FOR WHEEZING 2 Inhaler 4    budesonide-formoterol (SYMBICORT) 160-4.5 MCG/ACT AERO Inhale 2 puffs into the lungs 2 times daily 1 Inhaler 3    promethazine-codeine (PHENERGAN WITH CODEINE) 6.25-10 MG/5ML syrup Take 5 mLs by mouth every 4 hours as needed for Cough.  240 mL 1    loratadine (CLARITIN) 10 MG tablet Take 10 mg by mouth daily      albuterol sulfate  (90 Base) MCG/ACT inhaler Inhale 1 puff into the lungs 2 times daily      Cholecalciferol (VITAMIN D) 2000 UNITS TABS Take Behavior: Behavior normal.         Thought Content: Thought content normal.         Judgment: Judgment normal.           Assessment and Plan:      Viral upper respiratory tract infection  Trial without abx but use if persists     Asthma  Well controlled     Class 1 obesity due to excess calories with serious comorbidity and body mass index (BMI) of 30.0 to 30.9 in adult  Discussed with patient at length health risks of obesity and need for diet and exercise      Chronic kidney disease, stage II (mild)  Cont to follow and control rf's     Essential hypertension  Watch at home     Hyperglycemia  Follow glyco and watch carbs in her diet          Complete physical completed. Patient now up to date with all routine screening including Pap and colonoscopy if needed, yearly eye and dental exams,labs, dexa if indicated. Counseled re: nutrition/calciumand vit d supplementation,seat belt use, no cell phone use with driving.

## 2020-03-03 LAB
CHOLESTEROL, TOTAL: 174 MG/DL (ref 0–200)
HBA1C MFR BLD: 6 % (ref 4–5.6)
HDLC SERPL-MCNC: 45 MG/DL (ref 60–92)
LDL CHOLESTEROL CALCULATED: 110 MG/DL
TRIGL SERPL-MCNC: 93 MG/DL (ref 10–149)
TSH, 3RD GENERATION: 1.27 UIU/ML (ref 0.45–4.12)

## 2020-03-05 ENCOUNTER — TELEPHONE (OUTPATIENT)
Dept: INTERNAL MEDICINE CLINIC | Age: 75
End: 2020-03-05

## 2020-04-13 ENCOUNTER — TELEPHONE (OUTPATIENT)
Dept: INTERNAL MEDICINE CLINIC | Age: 75
End: 2020-04-13

## 2020-04-13 RX ORDER — CLONIDINE HYDROCHLORIDE 0.1 MG/1
TABLET ORAL
Qty: 120 TABLET | Refills: 5 | Status: SHIPPED | OUTPATIENT
Start: 2020-04-13 | End: 2020-05-12 | Stop reason: SDUPTHER

## 2020-05-08 ENCOUNTER — FOLLOWUP TELEPHONE ENCOUNTER (OUTPATIENT)
Dept: NEPHROLOGY | Age: 75
End: 2020-05-08

## 2020-05-11 ENCOUNTER — TELEPHONE (OUTPATIENT)
Dept: INTERNAL MEDICINE CLINIC | Age: 75
End: 2020-05-11

## 2020-05-11 RX ORDER — HYDRALAZINE HYDROCHLORIDE 10 MG/1
10 TABLET, FILM COATED ORAL 4 TIMES DAILY
Qty: 120 TABLET | Refills: 2 | Status: SHIPPED | OUTPATIENT
Start: 2020-05-11 | End: 2020-05-12 | Stop reason: ALTCHOICE

## 2020-05-11 NOTE — TELEPHONE ENCOUNTER
The patient is report fluxing BP, and dizziness.  Please advise    Last /102  172/114  168/101      SHARAD LATIF [address], [place] [address] - [address] [phone]

## 2020-05-11 NOTE — TELEPHONE ENCOUNTER
New medicine sent to her [facility]. Please schedule visit/video visit with Dr. Ramon Peter for later this week.     Orders Placed This Encounter   Medications    hydrALAZINE (APRESOLINE) 10 MG tablet     Sig: Take 1 tablet by mouth 4 times daily     Dispense:  120 tablet     Refill:  2

## 2020-05-15 ENCOUNTER — VIRTUAL VISIT (OUTPATIENT)
Dept: INTERNAL MEDICINE CLINIC | Age: 75
End: 2020-05-15
Payer: COMMERCIAL

## 2020-05-15 VITALS
BODY MASS INDEX: 27.41 KG/M2 | WEIGHT: 175 LBS | DIASTOLIC BLOOD PRESSURE: 72 MMHG | SYSTOLIC BLOOD PRESSURE: 148 MMHG | TEMPERATURE: 95.5 F

## 2020-05-15 PROCEDURE — 99214 OFFICE O/P EST MOD 30 MIN: CPT | Performed by: INTERNAL MEDICINE

## 2020-05-15 RX ORDER — DIAZEPAM 5 MG/1
TABLET ORAL
Qty: 20 TABLET | Refills: 0 | Status: SHIPPED | OUTPATIENT
Start: 2020-05-15 | End: 2020-05-22

## 2020-05-15 NOTE — PROGRESS NOTES
5/15/2020    TELEHEALTH EVALUATION -- Audio/Visual (During GPTZG-37 public health emergency)    HPI:    Carmelita Chávez (:  1945) has requested an audio/video evaluation for the following concern(s):  Has been checking bp's regularly and very irratic-was fine at Dr. Hobbs [place] but high on her checks-having severe dizziness especially w rapid head movements-has no associated neurological symptoms-dizziness definitely worse w the head movements- very anxious over the covid pandemic and feels panicked at times-wants to see cardiology for heart eval but having no real symptoms-weight down to 175 so working on this aggresively    Review of Systems   Constitutional: Positive for fatigue. Negative for activity change, appetite change and unexpected weight change. HENT: Negative for congestion. Eyes: Negative for visual disturbance. Respiratory: Negative for cough, chest tightness, shortness of breath and wheezing. Cardiovascular: Negative for chest pain, palpitations and leg swelling. Gastrointestinal: Negative for abdominal pain. Musculoskeletal: Negative for arthralgias and back pain. Skin: Negative for color change and rash. Neurological: Positive for dizziness. Negative for seizures, syncope, speech difficulty, weakness, light-headedness and headaches. Psychiatric/Behavioral: The patient is nervous/anxious. Prior to Visit Medications    Medication Sig Taking?  Authorizing Provider   diazePAM (VALIUM) 5 MG tablet 1/2-1 q 8 prn severe anxiety Yes Janie Hewitt MD   cloNIDine (CATAPRES) 0.2 MG tablet TAKE ONE TABLET BY MOUTH four TIMES A DAY Yes Colleen Jones MD   butalbital-acetaminophen-caffeine (FIORICET, ESGIC) -40 MG per tablet Take 1 tablet by mouth every 4 hours as needed for Headaches Yes Colleen Jones MD   diltiazem (TIAZAC) 240 MG extended release capsule TAKE 1 CAPSULE BY MOUTH EVERY DAY Yes Janie Hewitt MD   albuterol sulfate HFA (PROAIR HFA) 108 (90 Base) MCG/ACT inhaler INHALE TWO PUFFS BY MOUTH THREE TIMES A DAY AS NEEDED FOR WHEEZING Yes Kathryn Akbar MD   budesonide-formoterol (SYMBICORT) 160-4.5 MCG/ACT AERO Inhale 2 puffs into the lungs 2 times daily Yes Kathryn Akbar MD   famotidine (PEPCID) 20 MG tablet Take 1 tablet by mouth 2 times daily Yes Kathryn Akbar MD   olmesartan (BENICAR) 40 MG tablet Take 1 tablet by mouth daily Yes Marleny Dee MD   montelukast (SINGULAIR) 10 MG tablet Take 1 tablet by mouth nightly Yes Kathryn Akbar MD   loratadine (CLARITIN) 10 MG tablet Take 10 mg by mouth daily Yes Historical Provider, MD   albuterol sulfate  (90 Base) MCG/ACT inhaler Inhale 1 puff into the lungs 2 times daily Yes Historical Provider, MD       Social History     Tobacco Use    Smoking status: Never Smoker    Smokeless tobacco: Never Used   Substance Use Topics    Alcohol use: No    Drug use: No        Past Medical History:   Diagnosis Date    Allergic rhinitis, cause unspecified 8/23/2010    Asymptomatic varicose veins 8/23/2010    Calcium oxalate renal stones     Chronic kidney disease, stage II (mild) 8/23/2010    ? due to stones    Injury of right hand 10/13/2015    Torn ligament and carpal tunnel  [facility] Jennifer Francois at [facility] [address] Kidney stone on right side 11/11/2011    Leiomyoma of uterus, unspecified 8/23/2010    Obesity, unspecified 8/23/2010    Pain in limb 8/23/2010    Pap smear 7/16/2009    Negative    S/P colonoscopy 10/11/2006    Normal    Screening mammogram 8/4/2009    (Both) Negative    Unspecified asthma(493.90) 08/23/2010    Unspecified essential hypertension 8/23/2010       PHYSICAL EXAMINATION:  [ INSTRUCTIONS:  \"[x]\" Indicates a positive item  \"[]\" Indicates a negative item  -- DELETE ALL ITEMS NOT EXAMINED]  Vital Signs: (As obtained by patient/caregiver or practitioner observation)    Blood pressure-  Heart rate-    Respiratory rate-    Temperature-  Pulse oximetry- see flowsheet    Constitutional: [x] Appears

## 2020-05-16 PROBLEM — R42 DIZZINESS: Status: ACTIVE | Noted: 2020-05-16

## 2020-05-16 PROBLEM — F41.9 ANXIETY: Status: ACTIVE | Noted: 2020-05-16

## 2020-05-16 ASSESSMENT — ENCOUNTER SYMPTOMS
COUGH: 0
SHORTNESS OF BREATH: 0
COLOR CHANGE: 0
ABDOMINAL PAIN: 0
CHEST TIGHTNESS: 0
WHEEZING: 0
BACK PAIN: 0

## 2020-05-19 ENCOUNTER — TELEPHONE (OUTPATIENT)
Dept: INTERNAL MEDICINE CLINIC | Age: 75
End: 2020-05-19

## 2020-05-19 RX ORDER — AMLODIPINE BESYLATE 2.5 MG/1
TABLET ORAL
Qty: 30 TABLET | Refills: 3 | COMMUNITY
Start: 2020-05-19 | End: 2020-05-26

## 2020-05-19 NOTE — TELEPHONE ENCOUNTER
Please call mirta and have her increase norvasc to 5 mg- wait about 4-5 days and if not effective increase to 10 mg which is the suggested average dose- wait another 5 days or so and if still not down have her see me Friday of next week- I am going to open up the afternoon as coming home Thursday!

## 2020-05-26 RX ORDER — AMLODIPINE BESYLATE 5 MG/1
TABLET ORAL
Qty: 90 TABLET | Refills: 0 | Status: SHIPPED | OUTPATIENT
Start: 2020-05-26 | End: 2020-08-27

## 2020-05-26 RX ORDER — AMLODIPINE BESYLATE 2.5 MG/1
TABLET ORAL
Qty: 30 TABLET | Refills: 2 | Status: SHIPPED | OUTPATIENT
Start: 2020-05-26 | End: 2020-05-26

## 2020-06-02 ENCOUNTER — VIRTUAL VISIT (OUTPATIENT)
Dept: INTERNAL MEDICINE CLINIC | Age: 75
End: 2020-06-02
Payer: COMMERCIAL

## 2020-06-02 ENCOUNTER — HOSPITAL ENCOUNTER (OUTPATIENT)
Dept: MAMMOGRAPHY | Age: 75
Discharge: HOME | End: 2020-06-02
Payer: COMMERCIAL

## 2020-06-02 VITALS
DIASTOLIC BLOOD PRESSURE: 83 MMHG | HEIGHT: 66 IN | TEMPERATURE: 96.5 F | SYSTOLIC BLOOD PRESSURE: 130 MMHG | WEIGHT: 174 LBS | BODY MASS INDEX: 27.97 KG/M2

## 2020-06-02 PROCEDURE — 99214 OFFICE O/P EST MOD 30 MIN: CPT | Performed by: INTERNAL MEDICINE

## 2020-06-02 PROCEDURE — 77063 BREAST TOMOSYNTHESIS BI: CPT

## 2020-06-02 RX ORDER — MONTELUKAST SODIUM 10 MG/1
10 TABLET ORAL DAILY
Qty: 30 TABLET | Refills: 3 | Status: SHIPPED | OUTPATIENT
Start: 2020-06-02 | End: 2020-09-16 | Stop reason: SDUPTHER

## 2020-06-02 RX ORDER — PROMETHAZINE HYDROCHLORIDE AND CODEINE PHOSPHATE 6.25; 1 MG/5ML; MG/5ML
5 SYRUP ORAL EVERY 4 HOURS PRN
Qty: 240 ML | Refills: 1 | Status: SHIPPED | OUTPATIENT
Start: 2020-06-02 | End: 2020-08-03 | Stop reason: SDUPTHER

## 2020-06-02 RX ORDER — DOXYCYCLINE HYCLATE 100 MG
100 TABLET ORAL 2 TIMES DAILY
Qty: 20 TABLET | Refills: 0 | Status: SHIPPED | OUTPATIENT
Start: 2020-06-02 | End: 2020-06-12

## 2020-06-02 RX ORDER — PREDNISONE 20 MG/1
40 TABLET ORAL DAILY
Qty: 14 TABLET | Refills: 0 | Status: SHIPPED | OUTPATIENT
Start: 2020-06-02 | End: 2020-06-09 | Stop reason: ALTCHOICE

## 2020-06-02 NOTE — PROGRESS NOTES
25 mins    Services were provided through a video synchronous discussion virtually to substitute for in-person clinic visit. Patient and provider were located at their individual homes. --Aly Painter MD on 6/2/2020 at 3:48 PM    An electronic signature was used to authenticate this note.

## 2020-07-06 RX ORDER — DILTIAZEM HYDROCHLORIDE 240 MG/1
CAPSULE, EXTENDED RELEASE ORAL
Qty: 30 CAPSULE | Refills: 5 | Status: SHIPPED | OUTPATIENT
Start: 2020-07-06 | End: 2020-07-13 | Stop reason: SDUPTHER

## 2020-07-10 RX ORDER — FAMOTIDINE 20 MG/1
TABLET, FILM COATED ORAL
Qty: 60 TABLET | Refills: 0 | Status: SHIPPED | OUTPATIENT
Start: 2020-07-10 | End: 2020-08-12

## 2020-07-13 RX ORDER — DILTIAZEM HYDROCHLORIDE 240 MG/1
240 CAPSULE, EXTENDED RELEASE ORAL DAILY
Qty: 30 CAPSULE | Refills: 5 | Status: SHIPPED | OUTPATIENT
Start: 2020-07-13 | End: 2020-09-15 | Stop reason: SDUPTHER

## 2020-08-03 ENCOUNTER — PATIENT MESSAGE (OUTPATIENT)
Dept: INTERNAL MEDICINE CLINIC | Age: 75
End: 2020-08-03

## 2020-08-04 RX ORDER — AZITHROMYCIN 250 MG/1
TABLET, FILM COATED ORAL
Qty: 6 TABLET | Refills: 0 | Status: SHIPPED | OUTPATIENT
Start: 2020-08-04 | End: 2020-09-16

## 2020-08-04 NOTE — TELEPHONE ENCOUNTER
From: Gracie Garcia  To: Raechel Prader, MD  Sent: 8/3/2020 6:15 PM EDT  Subject: Prescription Question    Good Morning    I am experiencing sinus infection and need a Zpack prescription, cough medicine and antibiotic called into my pharmacy. Yellow phlegm is present. My current temperature is 98.9, my BP is 138/85 and weight is 177.5. I can be reached at [phone] or please send link for [employer]. My current insurance is through [employer].     Please let me know if you have any questions.      Thank you    Gracie Garcia -  [phone]

## 2020-08-07 RX ORDER — PROMETHAZINE HYDROCHLORIDE AND CODEINE PHOSPHATE 6.25; 1 MG/5ML; MG/5ML
5 SYRUP ORAL EVERY 4 HOURS PRN
Qty: 240 ML | Refills: 1 | Status: SHIPPED | OUTPATIENT
Start: 2020-08-07 | End: 2020-09-16

## 2020-08-12 RX ORDER — FAMOTIDINE 20 MG/1
TABLET, FILM COATED ORAL
Qty: 60 TABLET | Refills: 0 | Status: SHIPPED | OUTPATIENT
Start: 2020-08-12 | End: 2020-09-09

## 2020-08-27 RX ORDER — AMLODIPINE BESYLATE 5 MG/1
TABLET ORAL
Qty: 30 TABLET | Refills: 3 | Status: SHIPPED | OUTPATIENT
Start: 2020-08-27 | End: 2020-08-28 | Stop reason: SDUPTHER

## 2020-08-28 RX ORDER — AMLODIPINE BESYLATE 5 MG/1
TABLET ORAL
Qty: 30 TABLET | Refills: 3 | Status: SHIPPED | OUTPATIENT
Start: 2020-08-28 | End: 2020-09-08 | Stop reason: ALTCHOICE

## 2020-09-09 RX ORDER — FAMOTIDINE 20 MG/1
TABLET, FILM COATED ORAL
Qty: 60 TABLET | Refills: 0 | Status: SHIPPED | OUTPATIENT
Start: 2020-09-09 | End: 2020-09-16 | Stop reason: SDUPTHER

## 2020-09-09 ASSESSMENT — LIFESTYLE VARIABLES
HOW OFTEN DURING THE LAST YEAR HAVE YOU NEEDED AN ALCOHOLIC DRINK FIRST THING IN THE MORNING TO GET YOURSELF GOING AFTER A NIGHT OF HEAVY DRINKING: 0
AUDIT TOTAL SCORE: 1
HOW OFTEN DO YOU HAVE A DRINK CONTAINING ALCOHOL: MONTHLY OR LESS
HOW OFTEN DO YOU HAVE SIX OR MORE DRINKS ON ONE OCCASION: NEVER
HOW OFTEN DURING THE LAST YEAR HAVE YOU BEEN UNABLE TO REMEMBER WHAT HAPPENED THE NIGHT BEFORE BECAUSE YOU HAD BEEN DRINKING: NEVER
AUDIT TOTAL SCORE: 0
HOW OFTEN DURING THE LAST YEAR HAVE YOU HAD A FEELING OF GUILT OR REMORSE AFTER DRINKING: NEVER
HOW OFTEN DO YOU HAVE SIX OR MORE DRINKS ON ONE OCCASION: 0
HOW OFTEN DURING THE LAST YEAR HAVE YOU FOUND THAT YOU WERE NOT ABLE TO STOP DRINKING ONCE YOU HAD STARTED: 0
HAVE YOU OR SOMEONE ELSE BEEN INJURED AS A RESULT OF YOUR DRINKING: 0
HOW OFTEN DO YOU HAVE A DRINK CONTAINING ALCOHOL: 1
HOW OFTEN DURING THE LAST YEAR HAVE YOU FAILED TO DO WHAT WAS NORMALLY EXPECTED FROM YOU BECAUSE OF DRINKING: NEVER
HAS A RELATIVE, FRIEND, DOCTOR, OR ANOTHER HEALTH PROFESSIONAL EXPRESSED CONCERN ABOUT YOUR DRINKING OR SUGGESTED YOU CUT DOWN: NO
HOW OFTEN DURING THE LAST YEAR HAVE YOU FOUND THAT YOU WERE NOT ABLE TO STOP DRINKING ONCE YOU HAD STARTED: NEVER
HOW MANY STANDARD DRINKS CONTAINING ALCOHOL DO YOU HAVE ON A TYPICAL DAY: ONE OR TWO
AUDIT-C TOTAL SCORE: 0
HOW OFTEN DURING THE LAST YEAR HAVE YOU HAD A FEELING OF GUILT OR REMORSE AFTER DRINKING: 0
HOW OFTEN DURING THE LAST YEAR HAVE YOU NEEDED AN ALCOHOLIC DRINK FIRST THING IN THE MORNING TO GET YOURSELF GOING AFTER A NIGHT OF HEAVY DRINKING: NEVER
HOW OFTEN DURING THE LAST YEAR HAVE YOU BEEN UNABLE TO REMEMBER WHAT HAPPENED THE NIGHT BEFORE BECAUSE YOU HAD BEEN DRINKING: 0
HAVE YOU OR SOMEONE ELSE BEEN INJURED AS A RESULT OF YOUR DRINKING: NO
AUDIT-C TOTAL SCORE: 1
HAS A RELATIVE, FRIEND, DOCTOR, OR ANOTHER HEALTH PROFESSIONAL EXPRESSED CONCERN ABOUT YOUR DRINKING OR SUGGESTED YOU CUT DOWN: 0
HOW OFTEN DURING THE LAST YEAR HAVE YOU FAILED TO DO WHAT WAS NORMALLY EXPECTED FROM YOU BECAUSE OF DRINKING: 0
HOW MANY STANDARD DRINKS CONTAINING ALCOHOL DO YOU HAVE ON A TYPICAL DAY: 0

## 2020-09-09 ASSESSMENT — PATIENT HEALTH QUESTIONNAIRE - PHQ9
SUM OF ALL RESPONSES TO PHQ QUESTIONS 1-9: 0
SUM OF ALL RESPONSES TO PHQ QUESTIONS 1-9: 0
SUM OF ALL RESPONSES TO PHQ9 QUESTIONS 1 & 2: 0
2. FEELING DOWN, DEPRESSED OR HOPELESS: 0
1. LITTLE INTEREST OR PLEASURE IN DOING THINGS: 0

## 2020-09-13 RX ORDER — DILTIAZEM HYDROCHLORIDE 240 MG/1
240 CAPSULE, EXTENDED RELEASE ORAL DAILY
Qty: 30 CAPSULE | Refills: 5 | Status: CANCELLED | OUTPATIENT
Start: 2020-09-13

## 2020-09-15 RX ORDER — DILTIAZEM HYDROCHLORIDE 240 MG/1
240 CAPSULE, EXTENDED RELEASE ORAL DAILY
Qty: 30 CAPSULE | Refills: 5 | Status: SHIPPED | OUTPATIENT
Start: 2020-09-15 | End: 2020-09-16 | Stop reason: SDUPTHER

## 2020-09-15 RX ORDER — DILTIAZEM HYDROCHLORIDE 240 MG/1
240 CAPSULE, EXTENDED RELEASE ORAL DAILY
Qty: 30 CAPSULE | Refills: 5 | Status: CANCELLED | OUTPATIENT
Start: 2020-09-15

## 2020-09-16 ENCOUNTER — OFFICE VISIT (OUTPATIENT)
Dept: INTERNAL MEDICINE CLINIC | Age: 75
End: 2020-09-16

## 2020-09-16 VITALS
SYSTOLIC BLOOD PRESSURE: 138 MMHG | WEIGHT: 184 LBS | DIASTOLIC BLOOD PRESSURE: 80 MMHG | HEIGHT: 67 IN | TEMPERATURE: 97.7 F | BODY MASS INDEX: 28.88 KG/M2

## 2020-09-16 PROBLEM — H25.9 AGE-RELATED CATARACT OF BOTH EYES: Status: RESOLVED | Noted: 2019-02-01 | Resolved: 2020-09-16

## 2020-09-16 PROBLEM — D72.10 EOSINOPHILIA: Status: ACTIVE | Noted: 2020-09-16

## 2020-09-16 PROCEDURE — 90471 IMMUNIZATION ADMIN: CPT | Performed by: INTERNAL MEDICINE

## 2020-09-16 PROCEDURE — 90694 VACC AIIV4 NO PRSRV 0.5ML IM: CPT | Performed by: INTERNAL MEDICINE

## 2020-09-16 PROCEDURE — G0402 INITIAL PREVENTIVE EXAM: HCPCS | Performed by: INTERNAL MEDICINE

## 2020-09-16 PROCEDURE — 99214 OFFICE O/P EST MOD 30 MIN: CPT | Performed by: INTERNAL MEDICINE

## 2020-09-16 RX ORDER — BUDESONIDE AND FORMOTEROL FUMARATE DIHYDRATE 160; 4.5 UG/1; UG/1
2 AEROSOL RESPIRATORY (INHALATION) 2 TIMES DAILY
Qty: 3 INHALER | Refills: 5 | Status: SHIPPED | OUTPATIENT
Start: 2020-09-16 | End: 2021-02-23 | Stop reason: SDUPTHER

## 2020-09-16 RX ORDER — DOXAZOSIN 2 MG/1
2 TABLET ORAL NIGHTLY
Qty: 90 TABLET | Refills: 3 | Status: SHIPPED | OUTPATIENT
Start: 2020-09-16 | End: 2020-12-02 | Stop reason: SDUPTHER

## 2020-09-16 RX ORDER — DILTIAZEM HYDROCHLORIDE 240 MG/1
240 CAPSULE, EXTENDED RELEASE ORAL DAILY
Qty: 90 CAPSULE | Refills: 3 | Status: SHIPPED | OUTPATIENT
Start: 2020-09-16 | End: 2020-12-14 | Stop reason: SDUPTHER

## 2020-09-16 RX ORDER — FAMOTIDINE 20 MG/1
TABLET, FILM COATED ORAL
Qty: 180 TABLET | Refills: 3 | Status: SHIPPED | OUTPATIENT
Start: 2020-09-16 | End: 2021-10-07

## 2020-09-16 RX ORDER — ALBUTEROL SULFATE 90 UG/1
AEROSOL, METERED RESPIRATORY (INHALATION)
Qty: 2 INHALER | Refills: 4 | Status: SHIPPED | OUTPATIENT
Start: 2020-09-16

## 2020-09-16 RX ORDER — LORAZEPAM 0.5 MG/1
0.5 TABLET ORAL EVERY 8 HOURS PRN
Qty: 20 TABLET | Refills: 0 | Status: SHIPPED | OUTPATIENT
Start: 2020-09-16 | End: 2020-11-09 | Stop reason: SDUPTHER

## 2020-09-16 RX ORDER — MONTELUKAST SODIUM 10 MG/1
10 TABLET ORAL DAILY
Qty: 90 TABLET | Refills: 3 | Status: SHIPPED | OUTPATIENT
Start: 2020-09-16

## 2020-09-16 RX ORDER — OLMESARTAN MEDOXOMIL 40 MG/1
TABLET ORAL
Qty: 90 TABLET | Refills: 3 | Status: SHIPPED | OUTPATIENT
Start: 2020-09-16 | End: 2020-12-02 | Stop reason: SDUPTHER

## 2020-09-16 RX ORDER — DOXAZOSIN 2 MG/1
2 TABLET ORAL NIGHTLY
COMMUNITY
End: 2020-09-16 | Stop reason: SDUPTHER

## 2020-09-16 ASSESSMENT — ENCOUNTER SYMPTOMS
WHEEZING: 0
BACK PAIN: 0
COLOR CHANGE: 0
CHEST TIGHTNESS: 0
ABDOMINAL PAIN: 0

## 2020-09-16 NOTE — PROGRESS NOTES
Vaccine Information Sheet, \"Influenza - Inactivated\"  given to Kierra Hines, or parent/legal guardian of  Kierra Hines and verbalized understanding. Patient responses:    Have you ever had a reaction to a flu vaccine? No  Do you have any current illness? No  Have you ever had Guillian Greeley Syndrome? No  Do you have a serious allergy to any of the follow: Neomycin, Polymyxin, Thimerosal, eggs or egg products? No    Flu vaccine given per order. Please see immunization tab. Risks and benefits explained. Current VIS given.

## 2020-09-16 NOTE — PROGRESS NOTES
tablet 500mg on day 1 followed by 250mg on days 2 - Shai MD Dionicio       Past Medical History:   Diagnosis Date    Allergic rhinitis, cause unspecified 8/23/2010    Asymptomatic varicose veins 8/23/2010    Calcium oxalate renal stones     Chronic kidney disease, stage II (mild) 8/23/2010    ? due to stones    Injury of right hand 10/13/2015    Torn ligament and carpal tunnel  Dr. Iron Suazo at [facility] stone on right side 11/11/2011    Leiomyoma of uterus, unspecified 8/23/2010    Obesity, unspecified 8/23/2010    Pain in limb 8/23/2010    Pap smear 7/16/2009    Negative    S/P colonoscopy 10/11/2006    Normal    Screening mammogram 8/4/2009    (Both) Negative    Unspecified asthma(493.90) 08/23/2010    Unspecified essential hypertension 8/23/2010       Past Surgical History:   Procedure Laterality Date    COLONOSCOPY  07/05/2016    LITHOTRIPSY      x2 last 1/2011    TOTAL NEPHRECTOMY      4/2013 right due to chronic infection       Family History   Problem Relation Age of Onset    Hypertension Mother     Hypertension Father     Stroke Father     Heart Disease Sister     Prostate Cancer Brother        CareTeam (Including outside providers/suppliers regularly involved in providing care):   Patient Care Team:  Raechel Prader, MD as PCP - Ayah Marino MD as PCP - [facility] Empaneled Provider  Brynn Zamudio MD as Consulting Physician (Urology)  Chito Bray MD as Consulting Physician (Otolaryngology)  Cally Hoff MD as Consulting Physician (Urology)  Heavenly White (Internal Medicine)    Wt Readings from Last 3 Encounters:   09/16/20 184 lb (83.5 kg)   09/08/20 186 lb 9.6 oz (84.6 kg)   06/09/20 177 lb (80.3 kg)     Vitals:    09/16/20 0956   BP: 138/80   Temp: 97.7 °F (36.5 °C)   Weight: 184 lb (83.5 kg)   Height: 5' 7\" (1.702 m)     Body mass index is 28.82 kg/m².     Based upon direct observation of the patient, evaluation of cognition reveals recent and remote Prediabetic)  03/03/2021    Breast cancer screen  06/02/2022    DTaP/Tdap/Td vaccine (3 - Td) 02/13/2024    Colon cancer screen colonoscopy  07/05/2026    Pneumococcal 65+ years Vaccine  Completed    DEXA (modify frequency per FRAX score)  Addressed    Hepatitis A vaccine  Aged Out    Hepatitis B vaccine  Aged Out    Hib vaccine  Aged Out    Meningococcal (ACWY) vaccine  Aged Out     Recommendations for [employer] Due: see orders and patient instructions/AVS.  . Recommended screening schedule for the next 5-10 years is provided to the patient in written form: see Patient Instructions/AVS.    There are no diagnoses linked to this encounter.

## 2020-09-16 NOTE — PROGRESS NOTES
Subjective:      Patient ID: Marisel Babin is a 70 y.o. female. Chief Complaint   Patient presents with    Medicare AWV     yearly,review labs () flu vac needed/ should she be on (norvasc and ditiazem) PER , ALSO/wanted her eosinphils checked? Very stressed w death of her brother- lots of anxiety with the pandemic and issues w brothers - still working on her weight- bp is up and down and being managed by Dr. Socorro Fox -has some phlegm off and on- eosinophil count is high so needs to see Dr. Chloe Colon- watching diet fairly closely w A1C down- asthma is fairly well controlled     Review of Systems   Constitutional: Negative for activity change, appetite change and fatigue. HENT: Negative for congestion. Eyes: Negative for visual disturbance. Respiratory: Negative for chest tightness and wheezing. Cardiovascular: Negative for chest pain and palpitations. Gastrointestinal: Negative for abdominal pain. Musculoskeletal: Negative for arthralgias and back pain. Skin: Negative for color change and rash. Neurological: Positive for weakness. Negative for light-headedness and headaches. Psychiatric/Behavioral: Positive for sleep disturbance. The patient is nervous/anxious. Objective:   Physical Exam  Constitutional:       Appearance: She is well-developed. HENT:      Head: Normocephalic and atraumatic. Eyes:      Conjunctiva/sclera: Conjunctivae normal.      Pupils: Pupils are equal, round, and reactive to light. Neck:      Musculoskeletal: Normal range of motion and neck supple. Cardiovascular:      Rate and Rhythm: Normal rate and regular rhythm. Heart sounds: Normal heart sounds. Pulmonary:      Effort: Pulmonary effort is normal. No respiratory distress. Breath sounds: Normal breath sounds. Abdominal:      General: There is no distension. Tenderness: There is no abdominal tenderness. Lymphadenopathy:      Cervical: No cervical adenopathy. Skin:     General: Skin is warm and dry. Neurological:      Mental Status: She is alert and oriented to person, place, and time. Psychiatric:         Mood and Affect: Mood normal.         Behavior: Behavior normal.         Thought Content: Thought content normal.         Judgment: Judgment normal.           Assessment and Plan:      Essential hypertension  Cont to follow up w      Anxiety  Worse w recent situation    Class 1 obesity due to excess calories with serious comorbidity and body mass index (BMI) of 30.0 to 30.9 in adult  Discussed with patient at length health risks of obesity and need for diet and exercise      Asthma  Well controlled-cont current regimen    Allergic rhinitis  See Dr. Rufino Olvera soon    Eosinophilia  See Dr. Rufino Olvera to discuss     Hyperglycemia  Well controlled        Encounter Diagnoses   Name Primary?     Routine general medical examination at a health care facility Yes    Essential hypertension     Anxiety     Class 1 obesity due to excess calories with serious comorbidity and body mass index (BMI) of 30.0 to 30.9 in adult     Mild intermittent asthma without complication     Allergic rhinitis, unspecified seasonality, unspecified trigger     Eosinophilia     Hyperglycemia     Need for influenza vaccination        Orders Placed This Encounter   Procedures    INFLUENZA, QUADV, ADJUVANTED, 65 YRS =, IM, PF, PREFILL SYR, 0.5ML (FLUAD)

## 2020-09-16 NOTE — LETTER
CONTROLLED SUBSTANCE MEDICATION AGREEMENT     Patient Name: Julian Cardenas Ativan 0.5 mg q12 prn anxiety   Patient YOB: 1945   I understand, that controlled substance medications may be used to help better manage my symptoms and to improve my ability to function at home, work and in social settings. However, I also understand that these medications do have risks, which have been discussed with me, including possible development of physical or psychological dependence. I understand that successful treatment requires mutual trust and honesty between me and my provider. I understand and agree that following this Medication Agreement is necessary in continuing my provider-patient relationship and the success of my treatment plan. Explanation from my Provider: Benefits and Goals of Controlled Substance Medications: There are two potential goals for your treatment: (1) decreased pain and suffering (2) improved daily life functions. There are many possible treatments for your chronic condition(s). Alternatives such as physical therapy, yoga, massage, home daily exercise, meditation, relaxation techniques, injections, chiropractic manipulations, surgery, cognitive therapy, hypnosis and many medications that are not habit-forming may be used. Use of controlled substance medications may be helpful, but they are unlikely to resolve all symptoms or restore all function.    Explanation from my Provider: Risks of Controlled Substance Medications: Opioid pain medications: These medications can lead to problems such as addiction/dependence, sedation, lightheadedness/dizziness, memory issues, falls, constipation, nausea, or vomiting. They may also impair the ability to drive or operate machinery. Additionally, these medications may lower testosterone levels, leading to loss of bone strength, stamina and sex drive. They may cause problems with breathing, sleep apnea and reduced coughing, which is especially dangerous for patients with lung disease. Overdose or dangerous interactions with alcohol and other medications may occur, leading to death. Hyperalgesia may develop, which means patients receiving opioids for the treatment of pain may become more sensitive to certain painful stimuli, and in some cases, experience pain from ordinarily non-painful stimuli. Women between the ages of 14-53 who could become pregnant should carefully weigh the risks and benefits of opioids with their physicians, as these medications increase the risk of pregnancy complications, including miscarriage,  delivery and stillbirth. It is also possible for babies to be born addicted to opioids. Opioid dependence withdrawal symptoms may include; feelings of uneasiness, increased pain, irritability, belly pain, diarrhea, sweats and goose-flesh. Benzodiazepines and non-benzodiazepine sleep medications: These medications can lead to problems such as addiction/dependence, sedation, fatigue, lightheadedness, dizziness, incoordination, falls, depression, hallucinations, and impaired judgment, memory and concentration. The ability to drive and operate machinery may also be affected. Abnormal sleep-related behaviors have been reported, including sleepwalking, driving, making telephone calls, eating, or having sex while not fully awake. These medications can suppress breathing and worsen sleep apnea, particularly when combined with alcohol or other sedating medications, potentially leading to death. Dependence withdrawal symptoms may include tremors, anxiety, hallucinations and seizures. Stimulants:  Common adverse effects include addiction/dependence, increased blood  pressure and heart rate, decreased appetite, nausea, involuntary weight loss, insomnia,                                                                                                                     Initials:_______   irritability, and headaches. These risks may increase when these medications are combined with other stimulants, such as caffeine pills or energy drinks, certain weight loss supplements and oral decongestants. Dependence withdrawal symptoms may include depressed mood, loss of interest, suicidal thoughts, anxiety, fatigue, appetite changes and agitation. Testosterone replacement therapy:  Potential side effects include increased risk of stroke and heart attack, blood clots, increased blood pressure, increased cholesterol, enlarged prostate, sleep apnea, irritability/aggression and other mood disorders, and decreased fertility. I agree and understand that I and my prescriber have the following rights and responsibilities regarding my treatment plan:     1. MY RIGHTS:  To be informed of my treatment and medication plan. To be an active participant in my health and wellbeing. 2. MY RESPONSIBILITY AND UNDERSTANDING FOR USE OF MEDICATIONS  ? I will take medications at the dose and frequency as directed. For my safety, I will not increase or change how I take my medications without the recommendation of my healthcare provider. ? I will actively participate in any program recommended by my provider which may improve function, including social, physical, psychological programs. ? I will not take my medications with alcohol or other drugs not prescribed to me. I understand that drinking alcohol with my medications increases the chances of side effects, including reduced breathing rate and could lead to personal injury when operating machinery. ? I understand that if I have a history of substance use disorders, including alcohol or other illicit drugs, that I may be at increased risk of addiction to my medications. ? I agree to notify my provider immediately if I should become pregnant so that my treatment plan can be adjusted. ? I agree and understand that I shall only receive controlled substance medications from the prescriber that signed this agreement unless there is written agreement among other prescribers of controlled substances outlining the responsibility of the medications being prescribed. ? I understand that the if the controlled medication is not helping to achieve goals, the dosage may be tapered and no longer prescribed. 3. MY RESPONSIBILITY FOR COMMUNICATION / PRESCRIPTION RENEWALS  ? I agree that all controlled substance medications that I take will be prescribed only by my provider. If another healthcare provider prescribes me medication in an emergency, I will notify my provider within seventy-two (72) hours. ? I will arrange for refills at the prescribed interval ONLY during regular office hours. I will not ask for refills earlier than agreed, after-hours, on holidays or weekends. Refills may take up to 72 hours for processing and prescriptions to reach the pharmacy. ? I will inform my other health care providers that I am taking these medications and of the existence of this [id number]. In the event of an emergency, I will provide the same information to the emergency department prescribers. ? I will keep my provider updated on the pharmacy I am using for controlled medication prescription filling. Initials:_______  4. MY RESPONSIBILITY FOR PROTECTING MEDICATIONS  ? I will protect my prescriptions and medications. I understand that lost or misplaced prescriptions will not be replaced. ? I will keep medications only for my own use and will not share them with others. I will keep all medications away from children. ? I agree that if my medications are adjusted or discontinued, I will properly dispose of any remaining medications. I understand that I will be required to dispose of any remaining controlled medications as, directed by my prescriber, prior to being provided with any prescriptions for other controlled medications. Medication drop box locations can be found at: [employer]    5. MY RESPONSIBILITY WITH ILLEGAL DRUGS   ? I will not use illegal or street drugs or another person's prescription medications not prescribed to me.   ? If there are identified addiction type symptoms, then referral to a program may be provided by my provider and I agree to follow through with this recommendation.   6. MY RESPONSIBILITY FOR COOPERATION WITH INVESTIGATIONS ? I understand that my provider will comply with any applicable law and may discuss my use and/or possible misuse/abuse of controlled substances and alcohol, as appropriate, with any health care provider involved in my care, pharmacist, or legal authority. ? I authorize my provider and pharmacy to cooperate fully with law enforcement agencies (as permitted by law) in the investigation of any possible misuse, sale, or other diversion of my controlled substances. ? I agree to waive any applicable privilege or right of privacy or confidentiality with respect to these authorizations. 7. PROVIDERS RIGHT TO MONITOR FOR SAFETY: PRESCRIPTION MONITORING / DRUG TESTING  ? I consent to drug/toxicology screening and will submit to a drug screen upon my providers request to assure I am only taking the prescribed drugs for my safety monitoring. I understand that a drug screen is a laboratory test in which a sample of my urine, blood or saliva is checked to see what drugs I have been taking. This may entail an observed urine specimen, which means that a nurse or other health care provider may watch me provide urine, and I will cooperate if I am asked to provide an observed specimen. ? I understand that my provider will check a copy of my State Prescription Monitoring Program () Report in order to safely prescribe medications. ? Pill Counts: I consent to pill counts when requested. I may be asked to bring all my prescribed controlled substance medications, in their original bottles, to all of my scheduled appointments. In addition, my provider may ask me to come to the practice at any time for a random pill count. 8. TERMINATION OF THIS AGREEMENT  For my safety, my prescriber has the right to stop prescribing controlled substance medications and may end this agreement. Initials:_______  ?  Conditions that may result in termination of this agreement: a. I do not show any improvement in pain, or my activity has not improved. b. I develop rapid tolerance or loss of improvement, as described in my treatment plan.  c. I develop significant side effects from the medication. d. My behavior is not consistent with the responsibilities outlined above, thereby causing safety concerns to continue prescribing controlled substance medications. e. I fail to follow the terms of this agreement. f. Other:____________________________       UNDERSTANDING THIS MEDICATION AGREEMENT:    I have read the above and have had all my questions answered. For chronic disease management, I know that my symptoms can be managed with many types of treatments. A chronic medication trial may be part of my treatment, but I must be an active participant in my care. Medication therapy is only one part of my symptom management plan. In some cases, there may be limited scientific evidence to support the chronic use of certain medications to improve symptoms and daily function. Furthermore, in certain circumstances, there may be scientific information that suggests that the use of chronic controlled substances may worsen my symptoms and increase my risk of unintentional death directly related to this medication therapy. I know that if my provider feels my risk from controlled medications is greater than my benefit, I will have my controlled substance medication(s) compassionately lowered or removed altogether. I further agree to allow this office to contact my HIPAA contact if there are concerns about my safety and use of the controlled medications. I have agreed to use the prescribed controlled substance medications to me as instructed by my provider and as stated in this Medication Agreement. My initial on each page and my signature below shows that I have read each page and I have had the opportunity to ask questions with answers provided by my provider. Patient Name (Printed): _____________________________________  Patient Signature:  ______________________   Date: _____________    Prescriber Name (Printed): ___________________________________  Prescriber Signature: _____________________  Date: _____________

## 2020-11-09 RX ORDER — LORAZEPAM 0.5 MG/1
0.5 TABLET ORAL EVERY 8 HOURS PRN
Qty: 20 TABLET | Refills: 0 | Status: SHIPPED | OUTPATIENT
Start: 2020-11-09 | End: 2021-01-26 | Stop reason: SDUPTHER

## 2020-11-10 ENCOUNTER — TELEPHONE (OUTPATIENT)
Dept: ORTHOPEDIC SURGERY | Age: 75
End: 2020-11-10

## 2020-12-02 ENCOUNTER — FOLLOWUP TELEPHONE ENCOUNTER (OUTPATIENT)
Dept: NEPHROLOGY | Age: 75
End: 2020-12-02

## 2020-12-02 RX ORDER — DOXAZOSIN MESYLATE 4 MG/1
4 TABLET ORAL NIGHTLY
Qty: 30 TABLET | Refills: 5 | Status: SHIPPED | OUTPATIENT
Start: 2020-12-02 | End: 2021-01-12 | Stop reason: ALTCHOICE

## 2020-12-02 RX ORDER — OLMESARTAN MEDOXOMIL 40 MG/1
40 TABLET ORAL 2 TIMES DAILY
Qty: 90 TABLET | Refills: 3 | Status: SHIPPED | OUTPATIENT
Start: 2020-12-02 | End: 2021-04-21 | Stop reason: SDUPTHER

## 2020-12-02 NOTE — TELEPHONE ENCOUNTER
Pt called that her morning BP in 147 mmhg range   No edema   Will olmesartan to 40 BID and see  Labs reviewed    Shekhar Najera       Time spent 11 mins

## 2020-12-14 RX ORDER — DILTIAZEM HYDROCHLORIDE 240 MG/1
240 CAPSULE, EXTENDED RELEASE ORAL DAILY
Qty: 90 CAPSULE | Refills: 3 | Status: SHIPPED | OUTPATIENT
Start: 2020-12-14 | End: 2021-11-30 | Stop reason: SDUPTHER

## 2021-01-12 ENCOUNTER — VIRTUAL VISIT (OUTPATIENT)
Dept: INTERNAL MEDICINE CLINIC | Age: 76
End: 2021-01-12

## 2021-01-12 DIAGNOSIS — M79.671 PAIN IN BOTH FEET: ICD-10-CM

## 2021-01-12 DIAGNOSIS — N18.2 CHRONIC KIDNEY DISEASE, STAGE II (MILD): Chronic | ICD-10-CM

## 2021-01-12 DIAGNOSIS — E66.09 CLASS 1 OBESITY DUE TO EXCESS CALORIES WITH SERIOUS COMORBIDITY AND BODY MASS INDEX (BMI) OF 30.0 TO 30.9 IN ADULT: ICD-10-CM

## 2021-01-12 DIAGNOSIS — M79.672 PAIN IN BOTH FEET: ICD-10-CM

## 2021-01-12 DIAGNOSIS — E55.9 VITAMIN D DEFICIENCY: Primary | ICD-10-CM

## 2021-01-12 DIAGNOSIS — R73.9 HYPERGLYCEMIA: ICD-10-CM

## 2021-01-12 DIAGNOSIS — I10 ESSENTIAL HYPERTENSION: ICD-10-CM

## 2021-01-12 PROCEDURE — 99214 OFFICE O/P EST MOD 30 MIN: CPT | Performed by: INTERNAL MEDICINE

## 2021-01-12 ASSESSMENT — ENCOUNTER SYMPTOMS
CONSTIPATION: 1
COLOR CHANGE: 0
WHEEZING: 0
RECTAL PAIN: 1
BACK PAIN: 0
ABDOMINAL PAIN: 0
CHEST TIGHTNESS: 0

## 2021-01-12 ASSESSMENT — PATIENT HEALTH QUESTIONNAIRE - PHQ9
SUM OF ALL RESPONSES TO PHQ QUESTIONS 1-9: 0
SUM OF ALL RESPONSES TO PHQ9 QUESTIONS 1 & 2: 0
SUM OF ALL RESPONSES TO PHQ QUESTIONS 1-9: 0

## 2021-01-12 NOTE — PROGRESS NOTES
2021    TELEHEALTH EVALUATION -- Audio/Visual (During [facility]-88 public health emergency)    HPI:    Dakotah Shepherd (:  1945) has requested an audio/video evaluation for the following concern(s):    Saw Dr. Lisha Balderas today- had been seeing high bp's at home but usually gets down to 140's and Dr. Lisha Balderas felt that was good enough- to see him again in 4 mos- had been on some ibuprofen - creatinine is 1.35 last check - occasionally gets constipation and some rectal pain- relieved after a BM- still working on her weight but no progress w wt loss-not taking any vit d supplement- having some foot pain and given prednisone but doesn't want to take it     Review of Systems   Constitutional: Negative for activity change, appetite change and fatigue. HENT: Negative for congestion. Eyes: Negative for visual disturbance. Respiratory: Negative for chest tightness and wheezing. Cardiovascular: Negative for chest pain, palpitations and leg swelling. Gastrointestinal: Positive for constipation and rectal pain. Negative for abdominal pain. Musculoskeletal: Negative for arthralgias and back pain. Skin: Negative for color change and rash. Neurological: Negative for weakness, light-headedness and headaches. Psychiatric/Behavioral: Negative for dysphoric mood and sleep disturbance. The patient is not nervous/anxious. Prior to Visit Medications    Medication Sig Taking?  Authorizing Provider   metoprolol succinate (TOPROL XL) 25 MG extended release tablet Take 1 tablet by mouth daily Yes Lauren Ambrose MD   dilTIAZem (TIAZAC) 240 MG extended release capsule Take 1 capsule by mouth daily Yes Gomez Lynch MD   olmesartan (BENICAR) 40 MG tablet Take 1 tablet by mouth 2 times daily TAKE ONE TABLET BY MOUTH DAILY Yes Lauren Ambrose MD   famotidine (PEPCID) 20 MG tablet TAKE ONE TABLET BY MOUTH TWICE A DAY Yes Gomez Lynch MD   montelukast (SINGULAIR) 10 MG tablet Take 1 tablet by mouth daily Yes Fina Salud Gandhi MD   budesonide-formoterol (SYMBICORT) 160-4.5 MCG/ACT AERO Inhale 2 puffs into the lungs 2 times daily Yes Jadyen Smith MD   albuterol sulfate HFA (PROAIR HFA) 108 (90 Base) MCG/ACT inhaler INHALE TWO PUFFS BY MOUTH THREE TIMES A DAY AS NEEDED FOR WHEEZING Yes Jayden Smith MD   cetirizine (ZYRTEC) 10 MG tablet Take 10 mg by mouth daily Yes Historical Provider, MD   montelukast (SINGULAIR) 10 MG tablet Take 1 tablet by mouth nightly Yes Jayden Smith MD       Social History     Tobacco Use    Smoking status: Never Smoker    Smokeless tobacco: Never Used   Substance Use Topics    Alcohol use: No    Drug use: No        Past Medical History:   Diagnosis Date    Age-related cataract of both eyes 2/1/2019    Allergic rhinitis, cause unspecified 8/23/2010    Asymptomatic varicose veins 8/23/2010    Calcium oxalate renal stones     Chronic kidney disease, stage II (mild) 8/23/2010    ? due to stones    Injury of right hand 10/13/2015    Torn ligament and carpal tunnel  Dr. Kaiser Handing at [facility] [address] Kidney stone on right side 11/11/2011    Leiomyoma of uterus, unspecified 8/23/2010    Obesity, unspecified 8/23/2010    Pain in limb 8/23/2010    Pap smear 7/16/2009    Negative    S/P colonoscopy 10/11/2006    Normal    Screening mammogram 8/4/2009    (Both) Negative    Unspecified asthma(493.90) 08/23/2010    Unspecified essential hypertension 8/23/2010       Family History   Problem Relation Age of Onset    Hypertension Mother     Hypertension Father     Stroke Father     Heart Disease Sister     Prostate Cancer Brother          PHYSICAL EXAMINATION:  There were no vitals filed for this visit.      Constitutional: [x] Appears well-developed and well-nourished [x] No apparent distress      [] Abnormal-   Mental status  [x] Alert and awake  [x] Oriented to person/place/time [x]Able to follow commands      Eyes:  EOM    [x]  Normal  [] Abnormal-  Sclera  [x]  Normal  [] Abnormal - Discharge [x]  None visible  [] Abnormal -    HENT:   [x] Normocephalic, atraumatic. [] Abnormal   [x] Mouth/Throat: Mucous membranes are moist.     External Ears [x] Normal  [] Abnormal-     Neck: [x] No visualized mass     Pulmonary/Chest: [x] Respiratory effort normal.  [x] No visualized signs of difficulty breathing or respiratory distress        [] Abnormal-      Musculoskeletal:   [] Normal gait with no signs of ataxia         [x] Normal range of motion of neck        [] Abnormal-       Neurological:        [x] No Facial Asymmetry (Cranial nerve 7 motor function) (limited exam to video visit)          [x] No gaze palsy        [] Abnormal-         Skin:        [x] No significant exanthematous lesions or discoloration noted on facial skin         [] Abnormal-            Psychiatric:       [x] Normal Affect [x] No Hallucinations        [] Abnormal-         Assessment and Plan:      Chronic kidney disease, stage II (mild)  Stable -Need to control all risk factors- monitor blood pressure, blood sugars , and lipids carefully and treat aggressively to avoid progression of renal disease        Class 1 obesity due to excess calories with serious comorbidity and body mass index (BMI) of 30.0 to 30.9 in adult  Discussed with patient at length health risks of obesity and need for diet and exercise      Essential hypertension  Cont regimen per Dr. Jayce Tim and monitor closely     Hyperglycemia  Watch carbs and follow A1C     Pain in both feet  Cont f/u w podiatry- may consider getting a second opinion              Return in about 3 months (around 4/12/2021) for follow up. Renee Jimenez is a 70 y.o. female being evaluated by a Virtual Visit (video visit) encounter to address concerns as mentioned above. A caregiver was present when appropriate.  Due to this being a TeleHealth encounter ([facility]), evaluation of the following organ systems was limited: Vitals/Constitutional/EENT/Resp/CV/GI//MS/Neuro/Skin/Heme-Lymph-Imm. Pursuant to the emergency declaration under the [address] [place] [address] and the [employer] and [employer] Act, this Virtual Visit was conducted with patient's (and/or legal guardian's) consent, to reduce the patient's risk of exposure to COVID-19 and provide necessary medical care. The patient (and/or legal guardian) has also been advised to contact this office for worsening conditions or problems, and seek emergency medical treatment and/or call 911 if deemed necessary. Patient identification was verified at the start of the visit: yes    Total time spent on this encounter: 25 mins    Services were provided through a video synchronous discussion virtually to substitute for in-person clinic visit. Patient and provider were located at their individual homes. --Tracey Gonzales MD on 1/13/2021 at 6:17 PM    An electronic signature was used to authenticate this note.

## 2021-01-12 NOTE — ASSESSMENT & PLAN NOTE
Stable -Need to control all risk factors- monitor blood pressure, blood sugars , and lipids carefully and treat aggressively to avoid progression of renal disease

## 2021-01-13 PROBLEM — M79.671 PAIN IN BOTH FEET: Status: ACTIVE | Noted: 2021-01-13

## 2021-01-13 PROBLEM — M79.672 PAIN IN BOTH FEET: Status: ACTIVE | Noted: 2021-01-13

## 2021-01-26 DIAGNOSIS — F41.9 ANXIETY: ICD-10-CM

## 2021-01-26 RX ORDER — LORAZEPAM 0.5 MG/1
0.5 TABLET ORAL EVERY 8 HOURS PRN
Qty: 30 TABLET | Refills: 0 | OUTPATIENT
Start: 2021-01-26 | End: 2021-02-25

## 2021-02-23 RX ORDER — BUDESONIDE AND FORMOTEROL FUMARATE DIHYDRATE 160; 4.5 UG/1; UG/1
2 AEROSOL RESPIRATORY (INHALATION) 2 TIMES DAILY
Qty: 3 INHALER | Refills: 2 | Status: SHIPPED | OUTPATIENT
Start: 2021-02-23

## 2021-04-08 ENCOUNTER — PATIENT MESSAGE (OUTPATIENT)
Dept: INTERNAL MEDICINE CLINIC | Age: 76
End: 2021-04-08

## 2021-04-08 RX ORDER — CIPROFLOXACIN 250 MG/1
250 TABLET, FILM COATED ORAL 2 TIMES DAILY
Qty: 10 TABLET | Refills: 1 | Status: SHIPPED | OUTPATIENT
Start: 2021-04-08 | End: 2021-04-13

## 2021-04-08 NOTE — TELEPHONE ENCOUNTER
From: Efrain Schilder  To: Niyah Suazo MD  Sent: 4/8/2021 10:04 AM EDT  Subject: Prescription Question    Hi Dr Anisha Emmanuel  I hope you are doing well. I have a urinary tract infection and I need prescription for Ciproflaxcin 250mg. Problem started 2 days ago. Please call in prescription to [facility]. If I need to do an evisit please advise. I do not have a fever and Im urinating with burning between 5-10 times a day.    Thank you

## 2021-04-22 RX ORDER — OLMESARTAN MEDOXOMIL 40 MG/1
40 TABLET ORAL 2 TIMES DAILY
Qty: 90 TABLET | Refills: 3 | Status: SHIPPED | OUTPATIENT
Start: 2021-04-22 | End: 2021-06-01 | Stop reason: SDUPTHER

## 2021-05-14 LAB
AVERAGE GLUCOSE: NORMAL
HBA1C MFR BLD: 5.9 %

## 2021-05-28 ENCOUNTER — OFFICE VISIT (OUTPATIENT)
Dept: INTERNAL MEDICINE CLINIC | Age: 76
End: 2021-05-28
Payer: MEDICARE

## 2021-05-28 VITALS
TEMPERATURE: 97.2 F | HEIGHT: 67 IN | BODY MASS INDEX: 31.71 KG/M2 | DIASTOLIC BLOOD PRESSURE: 88 MMHG | SYSTOLIC BLOOD PRESSURE: 138 MMHG | WEIGHT: 202 LBS

## 2021-05-28 DIAGNOSIS — N18.2 CHRONIC KIDNEY DISEASE, STAGE II (MILD): Chronic | ICD-10-CM

## 2021-05-28 DIAGNOSIS — E66.09 CLASS 1 OBESITY DUE TO EXCESS CALORIES WITH SERIOUS COMORBIDITY AND BODY MASS INDEX (BMI) OF 30.0 TO 30.9 IN ADULT: ICD-10-CM

## 2021-05-28 DIAGNOSIS — R73.9 HYPERGLYCEMIA: ICD-10-CM

## 2021-05-28 DIAGNOSIS — I10 ESSENTIAL HYPERTENSION: ICD-10-CM

## 2021-05-28 DIAGNOSIS — R10.9 ABDOMINAL CRAMPING: ICD-10-CM

## 2021-05-28 DIAGNOSIS — F51.01 PRIMARY INSOMNIA: ICD-10-CM

## 2021-05-28 PROCEDURE — 99214 OFFICE O/P EST MOD 30 MIN: CPT | Performed by: INTERNAL MEDICINE

## 2021-05-28 RX ORDER — HYOSCYAMINE SULFATE 0.12 MG/1
1 TABLET SUBLINGUAL 4 TIMES DAILY PRN
Qty: 120 EACH | Refills: 3 | Status: SHIPPED | OUTPATIENT
Start: 2021-05-28 | End: 2022-03-17

## 2021-05-28 RX ORDER — ESCITALOPRAM OXALATE 20 MG/1
30 TABLET ORAL NIGHTLY
Qty: 30 TABLET | Refills: 3 | Status: SHIPPED | OUTPATIENT
Start: 2021-05-28 | End: 2021-08-19

## 2021-05-28 SDOH — ECONOMIC STABILITY: FOOD INSECURITY: WITHIN THE PAST 12 MONTHS, THE FOOD YOU BOUGHT JUST DIDN'T LAST AND YOU DIDN'T HAVE MONEY TO GET MORE.: NEVER TRUE

## 2021-05-28 SDOH — ECONOMIC STABILITY: FOOD INSECURITY: WITHIN THE PAST 12 MONTHS, YOU WORRIED THAT YOUR FOOD WOULD RUN OUT BEFORE YOU GOT MONEY TO BUY MORE.: NEVER TRUE

## 2021-05-28 ASSESSMENT — ENCOUNTER SYMPTOMS
WHEEZING: 0
BACK PAIN: 0
CONSTIPATION: 0
DIARRHEA: 0
CHEST TIGHTNESS: 0
COLOR CHANGE: 0
ABDOMINAL PAIN: 1
NAUSEA: 0

## 2021-05-28 ASSESSMENT — PATIENT HEALTH QUESTIONNAIRE - PHQ9
SUM OF ALL RESPONSES TO PHQ9 QUESTIONS 1 & 2: 0
SUM OF ALL RESPONSES TO PHQ QUESTIONS 1-9: 0

## 2021-05-28 ASSESSMENT — SOCIAL DETERMINANTS OF HEALTH (SDOH): HOW HARD IS IT FOR YOU TO PAY FOR THE VERY BASICS LIKE FOOD, HOUSING, MEDICAL CARE, AND HEATING?: NOT HARD AT ALL

## 2021-05-28 NOTE — PROGRESS NOTES
Subjective:      Patient ID: Chantell Beverly is a 70 y.o. female. Chief Complaint   Patient presents with    Abdominal Pain     right side pain 3-4 mos. sharp until having bowel ,recurring     Has been very stressed w daughters issues w hallucinations back- to see psychiatrist in late June- bp's have been elevated- having pain in abdomen- saw Dr. Elvie Joynre virtually- tried metamucil-   Most painful after eating and after BM's- using metamucil 1 tbsp tid- not sleeping well at night and having a great deal of anxiety-   Review of Systems   Constitutional: Positive for fatigue. Negative for activity change and appetite change. HENT: Negative for congestion. Eyes: Negative for visual disturbance. Respiratory: Negative for chest tightness and wheezing. Cardiovascular: Negative for chest pain and palpitations. Gastrointestinal: Positive for abdominal pain. Negative for constipation, diarrhea and nausea. Musculoskeletal: Negative for arthralgias and back pain. Skin: Negative for color change and rash. Neurological: Negative for weakness, light-headedness and headaches. Psychiatric/Behavioral: Positive for sleep disturbance. The patient is nervous/anxious.         Family History   Problem Relation Age of Onset    Hypertension Mother     Hypertension Father     Stroke Father     Heart Disease Sister     Prostate Cancer Brother      Social History     Socioeconomic History    Marital status:      Spouse name: Not on file    Number of children: Not on file    Years of education: Not on file    Highest education level: Not on file   Occupational History    Not on file   Tobacco Use    Smoking status: Never Smoker    Smokeless tobacco: Never Used   Substance and Sexual Activity    Alcohol use: No    Drug use: No    Sexual activity: Not on file   Other Topics Concern    Not on file   Social History Narrative    Not on file     Social Determinants of Health     Financial Resource Strain: Low Risk     Difficulty of Paying Living Expenses: Not hard at all   Food Insecurity: No Food Insecurity    Worried About Running Out of Food in the Last Year: Never true    Dinh of Food in the Last Year: Never true   Transportation Needs:     Lack of Transportation (Medical):  Lack of Transportation (Non-Medical):    Physical Activity:     Days of Exercise per Week:     Minutes of Exercise per Session:    Stress:     Feeling of Stress :    Social Connections:     Frequency of Communication with Friends and Family:     Frequency of Social Gatherings with Friends and Family:     Attends [employer] Services:     Active Member of Clubs or Organizations:     Attends Club or Organization Meetings:     Marital Status:    Intimate Partner Violence:     Fear of Current or Ex-Partner:     Emotionally Abused:     Physically Abused:     Sexually Abused:          Objective:   Physical Exam  Constitutional:       Appearance: She is well-developed. HENT:      Head: Normocephalic and atraumatic. Eyes:      Conjunctiva/sclera: Conjunctivae normal.      Pupils: Pupils are equal, round, and reactive to light. Cardiovascular:      Rate and Rhythm: Normal rate and regular rhythm. Pulses: Normal pulses. Heart sounds: Normal heart sounds. Pulmonary:      Effort: Pulmonary effort is normal. No respiratory distress. Breath sounds: Normal breath sounds. Abdominal:      General: There is no distension. Palpations: There is no mass. Tenderness: There is no abdominal tenderness. Hernia: No hernia is present. Musculoskeletal:      Cervical back: Normal range of motion and neck supple. Lymphadenopathy:      Cervical: No cervical adenopathy. Skin:     General: Skin is warm and dry. Neurological:      Mental Status: She is alert and oriented to person, place, and time.    Psychiatric:         Mood and Affect: Mood normal.         Behavior: Behavior normal. Thought Content: Thought content normal.         Judgment: Judgment normal.           Assessment and Plan:      Abdominal cramping   Likely IBS- See orders for patient and pt also given complete instructions re: course of therapy  If not helping see GI     Primary insomnia   Trial of lexapro and see Dr. Lozoya Offer 1 obesity due to excess calories with serious comorbidity and body mass index (BMI) of 30.0 to 30.9 in adult   Discussed with patient at length health risks of obesity and need for diet and exercise      Chronic kidney disease, stage II (mild)   Cont to f/u w Dr. Bobby Hoff    Hyperglycemia   Cont to f/u w glyco and watch diet     Essential hypertension   Monitor and call dr. Wilmer Pena if not coming down       Encounter Diagnoses   Name Primary?  Abdominal cramping     Primary insomnia     Class 1 obesity due to excess calories with serious comorbidity and body mass index (BMI) of 30.0 to 30.9 in adult     Chronic kidney disease, stage II (mild)     Hyperglycemia     Essential hypertension        No orders of the defined types were placed in this encounter.

## 2021-05-28 NOTE — ASSESSMENT & PLAN NOTE
Likely IBS- See orders for patient and pt also given complete instructions re: course of therapy  If not helping see GI

## 2021-05-28 NOTE — PATIENT INSTRUCTIONS
Use levsin under tongue before meals and as needed for cramping  Get benefiber  start w 1 tsp in water daily-increase by 1 tsp every few days up to 2 tbsp three times daily until effective to reverse IBS symptoms  See Dr. Tejal Dickey if not improving   Start lexapro w 1/2 or even 1/4 nightly- increase by 1/2 tab every 7-10 days until it is helping up to 1&1/2 tabs nightly  ??  Dr. Martín Acosta in our office for some therapy

## 2021-06-01 ENCOUNTER — TELEPHONE (OUTPATIENT)
Dept: ORTHOPEDIC SURGERY | Age: 76
End: 2021-06-01

## 2021-06-01 RX ORDER — OLMESARTAN MEDOXOMIL 40 MG/1
40 TABLET ORAL 2 TIMES DAILY
Qty: 60 TABLET | Refills: 0 | Status: SHIPPED | OUTPATIENT
Start: 2021-06-01 | End: 2022-01-10 | Stop reason: SDUPTHER

## 2021-06-01 NOTE — TELEPHONE ENCOUNTER
PA submitted via CM for Escitalopram Oxalate 20MG tablets. Pankaj ESTEBAN Case ID: [id number] - Rx #: [id number]    APPROVED today per [facility]. Coverage Start Date:05/02/2021  Coverage End Date:06/01/2022    Please advise patient. Thank you!

## 2021-08-19 RX ORDER — ESCITALOPRAM OXALATE 20 MG/1
TABLET ORAL
Qty: 45 TABLET | Refills: 1 | Status: SHIPPED | OUTPATIENT
Start: 2021-08-19 | End: 2022-06-29 | Stop reason: ALTCHOICE

## 2021-09-13 ENCOUNTER — HOSPITAL ENCOUNTER (OUTPATIENT)
Dept: MAMMOGRAPHY | Age: 76
Discharge: HOME | End: 2021-09-13
Payer: MEDICARE

## 2021-09-13 VITALS — HEIGHT: 66 IN | WEIGHT: 180 LBS | BODY MASS INDEX: 28.93 KG/M2

## 2021-09-13 DIAGNOSIS — Z12.31 VISIT FOR SCREENING MAMMOGRAM: ICD-10-CM

## 2021-09-13 PROCEDURE — 77067 SCR MAMMO BI INCL CAD: CPT

## 2021-10-07 RX ORDER — FAMOTIDINE 20 MG/1
TABLET, FILM COATED ORAL
Qty: 180 TABLET | Refills: 0 | Status: SHIPPED | OUTPATIENT
Start: 2021-10-07 | End: 2022-01-05

## 2021-12-06 RX ORDER — DILTIAZEM HYDROCHLORIDE 240 MG/1
CAPSULE, EXTENDED RELEASE ORAL
Qty: 90 CAPSULE | Refills: 0 | Status: SHIPPED | OUTPATIENT
Start: 2021-12-06 | End: 2022-03-29

## 2022-01-07 ENCOUNTER — OFFICE VISIT (OUTPATIENT)
Dept: INTERNAL MEDICINE CLINIC | Age: 77
End: 2022-01-07
Payer: MEDICARE

## 2022-01-07 VITALS
WEIGHT: 201.6 LBS | TEMPERATURE: 97.3 F | SYSTOLIC BLOOD PRESSURE: 130 MMHG | BODY MASS INDEX: 31.64 KG/M2 | HEIGHT: 67 IN | DIASTOLIC BLOOD PRESSURE: 86 MMHG | OXYGEN SATURATION: 96 % | HEART RATE: 65 BPM

## 2022-01-07 DIAGNOSIS — R73.9 HYPERGLYCEMIA: ICD-10-CM

## 2022-01-07 DIAGNOSIS — N18.2 CHRONIC KIDNEY DISEASE, STAGE II (MILD): ICD-10-CM

## 2022-01-07 DIAGNOSIS — J06.9 VIRAL UPPER RESPIRATORY TRACT INFECTION: ICD-10-CM

## 2022-01-07 DIAGNOSIS — E55.9 VITAMIN D DEFICIENCY: Primary | ICD-10-CM

## 2022-01-07 DIAGNOSIS — E78.00 PURE HYPERCHOLESTEROLEMIA: ICD-10-CM

## 2022-01-07 DIAGNOSIS — N18.30 STAGE 3 CHRONIC KIDNEY DISEASE, UNSPECIFIED WHETHER STAGE 3A OR 3B CKD (HCC): ICD-10-CM

## 2022-01-07 DIAGNOSIS — I10 ESSENTIAL HYPERTENSION: ICD-10-CM

## 2022-01-07 DIAGNOSIS — F41.9 ANXIETY: ICD-10-CM

## 2022-01-07 PROCEDURE — 99214 OFFICE O/P EST MOD 30 MIN: CPT | Performed by: INTERNAL MEDICINE

## 2022-01-07 ASSESSMENT — PATIENT HEALTH QUESTIONNAIRE - PHQ9
SUM OF ALL RESPONSES TO PHQ QUESTIONS 1-9: 0
2. FEELING DOWN, DEPRESSED OR HOPELESS: 0
SUM OF ALL RESPONSES TO PHQ QUESTIONS 1-9: 0
1. LITTLE INTEREST OR PLEASURE IN DOING THINGS: 0
SUM OF ALL RESPONSES TO PHQ9 QUESTIONS 1 & 2: 0

## 2022-01-07 ASSESSMENT — ENCOUNTER SYMPTOMS
ABDOMINAL PAIN: 0
BACK PAIN: 0
COLOR CHANGE: 0
CHEST TIGHTNESS: 0
WHEEZING: 0
SHORTNESS OF BREATH: 0
SINUS PRESSURE: 1

## 2022-01-07 NOTE — PROGRESS NOTES
Subjective:      Patient ID: Leatha Seth is a 78 y.o. female. Chief Complaint   Patient presents with    Sinus Problem     infection     Onset of nasal congestion 3 days ago- low grade temp-some myalgias- not screened properly at front or appt desk- feeling about the same today-no sob or severe cough- is overdue for her AWV-still working w her weight-bp sl up today here but has been ok at home-followed by Dr. Marce Witt for her renal disease and due to see him in next month or 2- conts on all of her meds     Review of Systems   Constitutional: Positive for fatigue. Negative for activity change and appetite change. HENT: Positive for congestion and sinus pressure. Eyes: Negative for visual disturbance. Respiratory: Negative for chest tightness, shortness of breath and wheezing. Cardiovascular: Negative for chest pain and palpitations. Gastrointestinal: Negative for abdominal pain. Musculoskeletal: Negative for arthralgias and back pain. Skin: Negative for color change and rash. Neurological: Negative for weakness, light-headedness and headaches.        Family History   Problem Relation Age of Onset    Hypertension Mother     Hypertension Father     Stroke Father     Heart Disease Sister     Prostate Cancer Brother      Social History     Socioeconomic History    Marital status:      Spouse name: Not on file    Number of children: Not on file    Years of education: Not on file    Highest education level: Not on file   Occupational History    Not on file   Tobacco Use    Smoking status: Never Smoker    Smokeless tobacco: Never Used   Substance and Sexual Activity    Alcohol use: No    Drug use: No    Sexual activity: Not on file   Other Topics Concern    Not on file   Social History Narrative    Not on file     Social Determinants of Health     Financial Resource Strain: Low Risk     Difficulty of Paying Living Expenses: Not hard at all   Food Insecurity: No Food Insecurity    Worried About Running Out of Food in the Last Year: Never true    Dinh of Food in the Last Year: Never true   Transportation Needs:     Lack of Transportation (Medical): Not on file    Lack of Transportation (Non-Medical): Not on file   Physical Activity:     Days of Exercise per Week: Not on file    Minutes of Exercise per Session: Not on file   Stress:     Feeling of Stress : Not on file   Social Connections:     Frequency of Communication with Friends and Family: Not on file    Frequency of Social Gatherings with Friends and Family: Not on file    Attends [employer] Services: Not on file    Active Member of [address] [place] [address] [employer] or Organizations: Not on file    Attends Club or Organization Meetings: Not on file    Marital Status: Not on file   Intimate Partner Violence:     Fear of Current or Ex-Partner: Not on file    Emotionally Abused: Not on file    Physically Abused: Not on file    Sexually Abused: Not on file   Housing Stability:     Unable to Pay for Housing in the Last Year: Not on file    Number of [place] in the Last Year: Not on file    Unstable Housing in the Last Year: Not on file         Objective:   Physical Exam  Constitutional:       Appearance: She is well-developed. HENT:      Head: Normocephalic and atraumatic. Eyes:      Pupils: Pupils are equal, round, and reactive to light. Cardiovascular:      Rate and Rhythm: Normal rate and regular rhythm. Pulmonary:      Effort: Pulmonary effort is normal.      Breath sounds: Normal breath sounds. Skin:     General: Skin is warm and dry. Neurological:      Mental Status: She is alert and oriented to person, place, and time.            Assessment and Plan:      Stage 3 chronic kidney disease, unspecified whether stage 3a or 3b CKD ([facility] [address]   For f/u w Dr. Laura ALMAZAN (upper respiratory infection)   Possibly covid-  pcr testing if she can find it- rest, otc's -call next week if not improving    Essential hypertension   Controlled w current regimen- continue and monitor closely      Chronic kidney disease, stage II (mild)   Cont f/u w Dr. Erin Avalos    Hyperglycemia   Watch carbs and [place]ito labs before physical in a month or so     Anxiety   Cont lexapro        Encounter Diagnoses   Name Primary?     Vitamin D deficiency Yes    Essential hypertension     Chronic kidney disease, stage II (mild)     Hyperglycemia     Pure hypercholesterolemia     Stage 3 chronic kidney disease, unspecified whether stage 3a or 3b CKD (HCC)     Viral upper respiratory tract infection     Anxiety        Orders Placed This Encounter   Procedures    CBC Auto Differential     Standing Status:   Future     Standing Expiration Date:   1/7/2023    Hepatic Function Panel     Standing Status:   Future     Standing Expiration Date:   1/7/2023    TSH without Reflex     Standing Status:   Future     Standing Expiration Date:   1/7/2023    Lipid Panel     Standing Status:   Future     Standing Expiration Date:   1/7/2023     Order Specific Question:   Is Patient Fasting?/# of Hours     Answer:   yes/10    Hemoglobin A1C     Standing Status:   Future     Standing Expiration Date:   1/7/2023    Vitamin D 25 Hydroxy     Standing Status:   Future     Standing Expiration Date:   1/7/2023

## 2022-03-17 ENCOUNTER — OFFICE VISIT (OUTPATIENT)
Dept: INTERNAL MEDICINE CLINIC | Age: 77
End: 2022-03-17
Payer: MEDICARE

## 2022-03-17 VITALS
TEMPERATURE: 97.4 F | WEIGHT: 201 LBS | HEIGHT: 67 IN | SYSTOLIC BLOOD PRESSURE: 138 MMHG | DIASTOLIC BLOOD PRESSURE: 88 MMHG | BODY MASS INDEX: 31.55 KG/M2

## 2022-03-17 DIAGNOSIS — M85.89 OSTEOPENIA OF MULTIPLE SITES: ICD-10-CM

## 2022-03-17 DIAGNOSIS — E55.9 VITAMIN D DEFICIENCY: ICD-10-CM

## 2022-03-17 DIAGNOSIS — N18.2 CHRONIC KIDNEY DISEASE, STAGE II (MILD): ICD-10-CM

## 2022-03-17 DIAGNOSIS — M85.80 OSTEOPENIA, UNSPECIFIED LOCATION: ICD-10-CM

## 2022-03-17 DIAGNOSIS — M81.0 AGE-RELATED OSTEOPOROSIS WITHOUT CURRENT PATHOLOGICAL FRACTURE: ICD-10-CM

## 2022-03-17 DIAGNOSIS — Z00.00 MEDICARE ANNUAL WELLNESS VISIT, SUBSEQUENT: ICD-10-CM

## 2022-03-17 DIAGNOSIS — R73.9 HYPERGLYCEMIA: ICD-10-CM

## 2022-03-17 DIAGNOSIS — N18.30 STAGE 3 CHRONIC KIDNEY DISEASE, UNSPECIFIED WHETHER STAGE 3A OR 3B CKD (HCC): ICD-10-CM

## 2022-03-17 DIAGNOSIS — E66.09 CLASS 1 OBESITY DUE TO EXCESS CALORIES WITH SERIOUS COMORBIDITY AND BODY MASS INDEX (BMI) OF 30.0 TO 30.9 IN ADULT: ICD-10-CM

## 2022-03-17 DIAGNOSIS — Z00.00 ENCOUNTER FOR ANNUAL WELLNESS EXAM IN MEDICARE PATIENT: ICD-10-CM

## 2022-03-17 DIAGNOSIS — E78.00 PURE HYPERCHOLESTEROLEMIA: ICD-10-CM

## 2022-03-17 DIAGNOSIS — I10 ESSENTIAL HYPERTENSION: ICD-10-CM

## 2022-03-17 DIAGNOSIS — K58.2 IRRITABLE BOWEL SYNDROME WITH BOTH CONSTIPATION AND DIARRHEA: ICD-10-CM

## 2022-03-17 DIAGNOSIS — D72.10 EOSINOPHILIA, UNSPECIFIED TYPE: Primary | ICD-10-CM

## 2022-03-17 PROBLEM — R10.9 ABDOMINAL CRAMPING: Status: RESOLVED | Noted: 2021-05-28 | Resolved: 2022-03-17

## 2022-03-17 PROBLEM — R42 DIZZINESS: Status: RESOLVED | Noted: 2020-05-16 | Resolved: 2022-03-17

## 2022-03-17 PROCEDURE — G0439 PPPS, SUBSEQ VISIT: HCPCS | Performed by: INTERNAL MEDICINE

## 2022-03-17 PROCEDURE — 99213 OFFICE O/P EST LOW 20 MIN: CPT | Performed by: INTERNAL MEDICINE

## 2022-03-17 RX ORDER — DICYCLOMINE HCL 20 MG
TABLET ORAL
COMMUNITY
Start: 2022-02-27

## 2022-03-17 ASSESSMENT — ENCOUNTER SYMPTOMS
BACK PAIN: 0
COLOR CHANGE: 0
CHEST TIGHTNESS: 0
WHEEZING: 0
ABDOMINAL PAIN: 0

## 2022-03-17 ASSESSMENT — PATIENT HEALTH QUESTIONNAIRE - PHQ9
SUM OF ALL RESPONSES TO PHQ QUESTIONS 1-9: 0
SUM OF ALL RESPONSES TO PHQ QUESTIONS 1-9: 0
2. FEELING DOWN, DEPRESSED OR HOPELESS: 0
SUM OF ALL RESPONSES TO PHQ QUESTIONS 1-9: 0
SUM OF ALL RESPONSES TO PHQ QUESTIONS 1-9: 0
SUM OF ALL RESPONSES TO PHQ9 QUESTIONS 1 & 2: 0
1. LITTLE INTEREST OR PLEASURE IN DOING THINGS: 0

## 2022-03-17 ASSESSMENT — LIFESTYLE VARIABLES: HOW OFTEN DO YOU HAVE A DRINK CONTAINING ALCOHOL: NEVER

## 2022-03-17 NOTE — PROGRESS NOTES
Subjective:      Patient ID: Luh Buck is a 78 y.o. female. Chief Complaint   Patient presents with    Medicare AWV     YEARLY/     bp's are good at home but sl high here- still stressed w care of her daughter- still struggling w her weight- seeing Dr. Merlin López regularly- feels well otherwise- ibs controlled w bentyl-taking it once daily    Review of Systems   Constitutional: Negative for activity change, appetite change and fatigue. HENT: Negative for congestion. Eyes: Negative for visual disturbance. Respiratory: Negative for chest tightness and wheezing. Cardiovascular: Negative for chest pain and palpitations. Gastrointestinal: Negative for abdominal pain. Musculoskeletal: Negative for arthralgias and back pain. Skin: Negative for color change and rash. Neurological: Negative for weakness, light-headedness and headaches. Family History   Problem Relation Age of Onset    Hypertension Mother     Hypertension Father     Stroke Father     Heart Disease Sister     Prostate Cancer Brother      Social History     Socioeconomic History    Marital status:      Spouse name: Not on file    Number of children: Not on file    Years of education: Not on file    Highest education level: Not on file   Occupational History    Not on file   Tobacco Use    Smoking status: Never Smoker    Smokeless tobacco: Never Used   Substance and Sexual Activity    Alcohol use: No    Drug use: No    Sexual activity: Not on file   Other Topics Concern    Not on file   Social History Narrative    Not on file     Social Determinants of Health     Financial Resource Strain: Low Risk     Difficulty of Paying Living Expenses: Not hard at all   Food Insecurity: No Food Insecurity    Worried About [address] in the Last Year: Never true    [address] in the Last Year: Never true   Transportation Needs:     Lack of Transportation (Medical):  Not on file    Lack of Transportation (Non-Medical): Not on file   Physical Activity: Insufficiently Active    Days of Exercise per Week: 3 days    Minutes of Exercise per Session: 30 min   Stress:     Feeling of Stress : Not on file   Social Connections:     Frequency of Communication with Friends and Family: Not on file    Frequency of Social Gatherings with Friends and Family: Not on file    Attends Jew Services: Not on file    Active Member of Clubs or Organizations: Not on file    Attends Club or Organization Meetings: Not on file    Marital Status: Not on file   Intimate Partner Violence:     Fear of Current or Ex-Partner: Not on file    Emotionally Abused: Not on file    Physically Abused: Not on file    Sexually Abused: Not on file   Housing Stability:     Unable to Pay for Housing in the Last Year: Not on file    Number of [place] in the Last Year: Not on file    Unstable Housing in the Last Year: Not on file         Objective:   Physical Exam  Constitutional:       Appearance: She is well-developed. HENT:      Head: Normocephalic and atraumatic. Eyes:      Pupils: Pupils are equal, round, and reactive to light. Cardiovascular:      Rate and Rhythm: Normal rate and regular rhythm. Pulmonary:      Effort: Pulmonary effort is normal.      Breath sounds: Normal breath sounds. Skin:     General: Skin is warm and dry. Neurological:      Mental Status: She is alert and oriented to person, place, and time.            Assessment and Plan:      Essential hypertension   Ok at home -cont to monitor     Class 1 obesity due to excess calories with serious comorbidity and body mass index (BMI) of 30.0 to 30.9 in adult   Discussed with patient at length health risks of obesity and need for diet and exercise      Hyperglycemia   Check labs     Stage 3 chronic kidney disease, unspecified whether stage 3a or 3b CKD ([facility] [address]   Cont to f/u w     Vitamin D deficiency   Recheck labs     Irritable bowel syndrome with both constipation and diarrhea   Cont fiber and prn bentyl    Osteopenia of multiple sites   Needs dexa to reassess-not checked since 2014-cont calcium and vit d        Encounter Diagnoses   Name Primary?     Eosinophilia, unspecified type Yes    Vitamin D deficiency     Essential hypertension     Class 1 obesity due to excess calories with serious comorbidity and body mass index (BMI) of 30.0 to 30.9 in adult     Chronic kidney disease, stage II (mild)     Hyperglycemia     Pure hypercholesterolemia     Osteopenia, unspecified location     Age-related osteoporosis without current pathological fracture     Stage 3 chronic kidney disease, unspecified whether stage 3a or 3b CKD ([facility] [address]     Irritable bowel syndrome with both constipation and diarrhea     Osteopenia of multiple sites        Orders Placed This Encounter   Procedures    DEXA BONE DENSITY 2 SITES     Standing Status:   Future     Standing Expiration Date:   3/17/2023     Scheduling Instructions:      Call [phone] push #1 then #1 to schedule     Order Specific Question:   Reason for exam:     Answer:   osteopenia    TSH     Standing Status:   Future     Standing Expiration Date:   3/17/2023    Hemoglobin A1C     Standing Status:   Future     Standing Expiration Date:   3/17/2023    Microalbumin / Creatinine Urine Ratio     Standing Status:   Future     Standing Expiration Date:   3/17/2023    Renal Function Panel     Standing Status:   Future     Standing Expiration Date:   3/17/2023    Hepatic Function Panel     Standing Status:   Future     Standing Expiration Date:   3/17/2023    Vitamin D 25 Hydroxy     Standing Status:   Future     Standing Expiration Date:   3/17/2023    Lipid Panel     Standing Status:   Future     Standing Expiration Date:   3/17/2023     Order Specific Question:   Is Patient Fasting?/# of Hours     Answer:   yes/10              Medicare Annual Wellness Visit    Pavel Cantrell is here for Medicare AWV (YEARLY/)    Assessment & Plan   Eosinophilia, unspecified type  -     TSH; Future  -     Hemoglobin A1C; Future  -     Microalbumin / Creatinine Urine Ratio; Future  -     Renal Function Panel; Future  -     Hepatic Function Panel; Future  -     Vitamin D 25 Hydroxy; Future  -     Lipid Panel; Future  Vitamin D deficiency  Assessment & Plan:   Recheck labs   Orders:  -     TSH; Future  -     Hemoglobin A1C; Future  -     Microalbumin / Creatinine Urine Ratio; Future  -     Renal Function Panel; Future  -     Hepatic Function Panel; Future  -     Vitamin D 25 Hydroxy; Future  -     Lipid Panel; Future  Essential hypertension  Assessment & Plan:   [id number] at home -cont to monitor     Orders:  -     TSH; Future  -     Hemoglobin A1C; Future  -     Microalbumin / Creatinine Urine Ratio; Future  -     Renal Function Panel; Future  -     Hepatic Function Panel; Future  -     Vitamin D 25 Hydroxy; Future  -     Lipid Panel; Future  Class 1 obesity due to excess calories with serious comorbidity and body mass index (BMI) of 30.0 to 30.9 in adult  Assessment & Plan:   Discussed with patient at length health risks of obesity and need for diet and exercise    Orders:  -     TSH; Future  -     Hemoglobin A1C; Future  -     Microalbumin / Creatinine Urine Ratio; Future  -     Renal Function Panel; Future  -     Hepatic Function Panel; Future  -     Vitamin D 25 Hydroxy; Future  -     Lipid Panel; Future  Chronic kidney disease, stage II (mild)  -     TSH; Future  -     Hemoglobin A1C; Future  -     Microalbumin / Creatinine Urine Ratio; Future  -     Renal Function Panel; Future  -     Hepatic Function Panel; Future  -     Vitamin D 25 Hydroxy; Future  -     Lipid Panel; Future  Hyperglycemia  Assessment & Plan:   Check labs   Orders:  -     TSH; Future  -     Hemoglobin A1C; Future  -     Microalbumin / Creatinine Urine Ratio; Future  -     Renal Function Panel; Future  -     Hepatic Function Panel;  Future  -     Vitamin D 25 Hydroxy; Future  -     Lipid Panel; Future  Pure hypercholesterolemia  -     TSH; Future  -     Hemoglobin A1C; Future  -     Microalbumin / Creatinine Urine Ratio; Future  -     Renal Function Panel; Future  -     Hepatic Function Panel; Future  -     Vitamin D 25 Hydroxy; Future  -     Lipid Panel; Future  Osteopenia, unspecified location  Age-related osteoporosis without current pathological fracture  -     DEXA BONE DENSITY 2 SITES; Future  Stage 3 chronic kidney disease, unspecified whether stage 3a or 3b CKD ([facility] [address]  Assessment & Plan:   Cont to f/u w   Irritable bowel syndrome with both constipation and diarrhea  Assessment & Plan:   Cont fiber and prn bentyl  Osteopenia of multiple sites  Assessment & Plan:   Needs dexa to reassess-not checked since 2014-cont calcium and vit d       Recommendations for Preventive Services Due: see orders and patient instructions/AVS.  Recommended screening schedule for the next 5-10 years is provided to the patient in written form: see Patient Instructions/AVS.     No follow-ups on file. Subjective   The following acute and/or chronic problems were also addressed today:  See note    Patient's complete Health Risk Assessment and screening values have been reviewed and are found in Flowsheets. The following problems were reviewed today and where indicated follow up appointments were made and/or referrals ordered.     Positive Risk Factor Screenings with Interventions:               General Health and ACP:  General  In general, how would you say your health is?: Very Good  In the past 7 days, have you experienced any of the following: New or Increased Pain, New or Increased Fatigue, Loneliness, Social Isolation, Stress or Anger?: No  Do you get the social and emotional support that you need?: (!) No  Do you have a Living Will?: Yes    Advance Directives     Power of  Living Will ACP-Advance Directive ACP-Power of     Not on File Not on File Not on File Not on File      General Health Risk Interventions:  · to cont to work w sig other for support    Health Habits/Nutrition:     Physical Activity: Insufficiently Active    Days of Exercise per Week: 3 days    Minutes of Exercise per Session: 30 min     Have you lost any weight without trying in the past 3 months?: No  Body mass index: (!) 31.48  Have you seen the dentist within the past year?: Yes    Health Habits/Nutrition Interventions:  · Nutritional issues:  patient is not ready to address his/her nutritional/weight issues at this time             Objective   Vitals:    03/17/22 1140   BP: 138/88   Temp: 97.4 °F (36.3 °C)   Weight: 201 lb (91.2 kg)   Height: 5' 7\" (1.702 m)      Body mass index is 31.48 kg/m². see note       Allergies   Allergen Reactions    Nsaids      Contraindicated w renal disease     Amoxicillin     Sulfa Antibiotics      Prior to Visit Medications    Medication Sig Taking?  Authorizing Provider   metoprolol succinate (TOPROL XL) 25 MG extended release tablet TAKE ONE TABLET BY MOUTH DAILY Yes Vivian Barrera MD   chlorthalidone (HYGROTON) 25 MG tablet Take 0.5 tablets by mouth daily Yes Vivian Barrera MD   olmesartan (BENICAR) 40 MG tablet TAKE ONE TABLET BY MOUTH twice daily Yes Jefe Araujo MD   famotidine (PEPCID) 20 MG tablet TAKE ONE TABLET BY MOUTH TWICE A DAY Yes Jefe Araujo MD   escitalopram (LEXAPRO) 20 MG tablet TAKE ONE AND ONE-HALF TABLETS BY MOUTH NIGHTLY Yes Jefe Araujo MD   budesonide-formoterol (SYMBICORT) 160-4.5 MCG/ACT AERO Inhale 2 puffs into the lungs 2 times daily Yes Jefe Araujo MD   montelukast (SINGULAIR) 10 MG tablet Take 1 tablet by mouth daily Yes Jefe Araujo MD   albuterol sulfate HFA (PROAIR HFA) 108 (90 Base) MCG/ACT inhaler INHALE TWO PUFFS BY MOUTH THREE TIMES A DAY AS NEEDED FOR WHEEZING Yes Jefe Araujo MD   cetirizine (ZYRTEC) 10 MG tablet Take 10 mg by mouth daily Yes Historical Provider, MD   dicyclomine (BENTYL) 20 MG tablet Historical Provider, MD   dilTIAZem (TIAZAC) 240 MG extended release capsule TAKE ONE CAPSULE BY MOUTH DAILY  Raymundo Bailey MD   montelukast (SINGULAIR) 10 MG tablet Take 1 tablet by mouth nightly  Raymundo Bailey MD       [facility] (Including outside providers/suppliers regularly involved in providing care):   Patient Care Team:  Raymundo Bailey MD as PCP - Malcolm Angeles MD as PCP - [facility] Empaneled Provider  Maddie Hutchinson MD as Consulting Physician (Urology)  Jose Sterling MD as Consulting Physician (Otolaryngology)  Terence Hoyt MD as Consulting Physician (Urology)  Umm Bolton (Internal Medicine)    Reviewed and updated this visit:  Tobacco  Allergies  Meds  Problems  Med Hx  Surg Hx  Soc Hx  Fam Hx

## 2022-03-17 NOTE — PATIENT INSTRUCTIONS
Personalized Preventive Plan for Clarissa Reaves - 3/17/2022  Medicare offers a range of preventive health benefits. Some of the tests and screenings are paid in full while other may be subject to a deductible, co-insurance, and/or copay. Some of these benefits include a comprehensive review of your medical history including lifestyle, illnesses that may run in your family, and various assessments and screenings as appropriate. After reviewing your medical record and screening and assessments performed today your provider may have ordered immunizations, labs, imaging, and/or referrals for you. A list of these orders (if applicable) as well as your Preventive Care list are included within your After Visit Summary for your review. Other Preventive Recommendations:    · A preventive eye exam performed by an eye specialist is recommended every 1-2 years to screen for glaucoma; cataracts, macular degeneration, and other eye disorders. · A preventive dental visit is recommended every 6 months. · Try to get at least 150 minutes of exercise per week or 10,000 steps per day on a pedometer . · Order or download the FREE \"Exercise & Physical Activity: Your Everyday Guide\" from [employer]. Call 2-227.569.5740 or search [employer] online. · You need 6649-9905 mg of calcium and 6112-6841 IU of vitamin D per day. It is possible to meet your calcium requirement with diet alone, but a vitamin D supplement is usually necessary to meet this goal.  · When exposed to the sun, use a sunscreen that protects against both UVA and UVB radiation with an SPF of 30 or greater. Reapply every 2 to 3 hours or after sweating, drying off with a towel, or swimming. · Always wear a seat belt when traveling in a car. Always wear a helmet when riding a bicycle or motorcycle.

## 2022-03-29 RX ORDER — DILTIAZEM HYDROCHLORIDE 240 MG/1
CAPSULE, EXTENDED RELEASE ORAL
Qty: 90 CAPSULE | Refills: 1 | Status: SHIPPED | OUTPATIENT
Start: 2022-03-29 | End: 2022-09-29 | Stop reason: SDUPTHER

## 2022-04-05 RX ORDER — FAMOTIDINE 20 MG/1
TABLET, FILM COATED ORAL
Qty: 180 TABLET | Refills: 3 | Status: SHIPPED | OUTPATIENT
Start: 2022-04-05 | End: 2022-06-29 | Stop reason: ALTCHOICE

## 2022-06-20 ENCOUNTER — TELEPHONE (OUTPATIENT)
Dept: INTERNAL MEDICINE CLINIC | Age: 77
End: 2022-06-20

## 2022-06-20 NOTE — TELEPHONE ENCOUNTER
Vitamin D looks good, improved and in the normal range, sugar (A1c) is higher than before, kidney function is stable, liver function normal, thyroid function normal.  We will also review all of her labs together when I see her on 29th.

## 2022-06-29 ENCOUNTER — OFFICE VISIT (OUTPATIENT)
Dept: INTERNAL MEDICINE CLINIC | Age: 77
End: 2022-06-29
Payer: MEDICARE

## 2022-06-29 VITALS
TEMPERATURE: 97.4 F | WEIGHT: 205.8 LBS | SYSTOLIC BLOOD PRESSURE: 142 MMHG | HEART RATE: 94 BPM | HEIGHT: 67 IN | BODY MASS INDEX: 32.3 KG/M2 | DIASTOLIC BLOOD PRESSURE: 90 MMHG | OXYGEN SATURATION: 97 %

## 2022-06-29 DIAGNOSIS — J30.9 ALLERGIC RHINITIS, UNSPECIFIED SEASONALITY, UNSPECIFIED TRIGGER: ICD-10-CM

## 2022-06-29 DIAGNOSIS — Z86.19 HISTORY OF HEPATITIS C: ICD-10-CM

## 2022-06-29 DIAGNOSIS — F41.9 ANXIETY: ICD-10-CM

## 2022-06-29 DIAGNOSIS — Z90.5 S/P NEPHRECTOMY: ICD-10-CM

## 2022-06-29 DIAGNOSIS — R73.9 HYPERGLYCEMIA: ICD-10-CM

## 2022-06-29 DIAGNOSIS — I10 ESSENTIAL HYPERTENSION: ICD-10-CM

## 2022-06-29 DIAGNOSIS — J45.20 MILD INTERMITTENT ASTHMA WITHOUT COMPLICATION: ICD-10-CM

## 2022-06-29 DIAGNOSIS — K58.2 IRRITABLE BOWEL SYNDROME WITH BOTH CONSTIPATION AND DIARRHEA: ICD-10-CM

## 2022-06-29 DIAGNOSIS — M85.89 OSTEOPENIA OF MULTIPLE SITES: Primary | ICD-10-CM

## 2022-06-29 DIAGNOSIS — N18.30 STAGE 3 CHRONIC KIDNEY DISEASE, UNSPECIFIED WHETHER STAGE 3A OR 3B CKD (HCC): ICD-10-CM

## 2022-06-29 PROBLEM — M79.671 PAIN IN BOTH FEET: Status: RESOLVED | Noted: 2021-01-13 | Resolved: 2022-06-29

## 2022-06-29 PROBLEM — M79.672 PAIN IN BOTH FEET: Status: RESOLVED | Noted: 2021-01-13 | Resolved: 2022-06-29

## 2022-06-29 PROCEDURE — 99214 OFFICE O/P EST MOD 30 MIN: CPT | Performed by: INTERNAL MEDICINE

## 2022-06-29 PROCEDURE — 1123F ACP DISCUSS/DSCN MKR DOCD: CPT | Performed by: INTERNAL MEDICINE

## 2022-06-29 RX ORDER — AZELASTINE 1 MG/ML
2 SPRAY, METERED NASAL 2 TIMES DAILY
Qty: 120 ML | Refills: 1 | Status: SHIPPED | OUTPATIENT
Start: 2022-06-29

## 2022-06-29 SDOH — ECONOMIC STABILITY: FOOD INSECURITY: WITHIN THE PAST 12 MONTHS, YOU WORRIED THAT YOUR FOOD WOULD RUN OUT BEFORE YOU GOT MONEY TO BUY MORE.: NEVER TRUE

## 2022-06-29 SDOH — ECONOMIC STABILITY: FOOD INSECURITY: WITHIN THE PAST 12 MONTHS, THE FOOD YOU BOUGHT JUST DIDN'T LAST AND YOU DIDN'T HAVE MONEY TO GET MORE.: NEVER TRUE

## 2022-06-29 ASSESSMENT — ENCOUNTER SYMPTOMS: SHORTNESS OF BREATH: 0

## 2022-06-29 ASSESSMENT — SOCIAL DETERMINANTS OF HEALTH (SDOH): HOW HARD IS IT FOR YOU TO PAY FOR THE VERY BASICS LIKE FOOD, HOUSING, MEDICAL CARE, AND HEATING?: NOT HARD AT ALL

## 2022-06-29 NOTE — ASSESSMENT & PLAN NOTE
- Prefer to avoid long-term Zyrtec which can be associated with cognitive decline.   Try Astelin spray

## 2022-06-29 NOTE — PROGRESS NOTES
[facility]  Establish care visit   2022    Veronica Reyna (:  1945) jean claude 78 y.o. female, here to establish care.     Chief Complaint   Patient presents with   [address]        Patient Active Problem List   Diagnosis    Class 1 obesity due to excess calories with serious comorbidity and body mass index (BMI) of 30.0 to 30.9 in adult    Asthma    Allergic rhinitis    Leiomyoma of uterus    Essential hypertension    Vitamin D deficiency    Atrophic vaginitis    Calcium oxalate renal stones    Chronic sinusitis    Hyperglycemia    History of hepatitis C    Primary osteoarthritis of both knees    Anxiety    Eosinophilia    Primary insomnia    Stage 3 chronic kidney disease, unspecified whether stage 3a or 3b CKD (HCC)    Irritable bowel syndrome with both constipation and diarrhea    Osteopenia of multiple sites    S/p nephrectomy       ASSESSMENT/ PLAN:    Essential hypertension  High yoday but reportedly 130s at home  -managed by nephrology  - continue diltiazem 240mg (takes 12pm), metoprolol 25 mg (12pm) daily, benicar 40 mg twice a day (per nephrology, above max dose?), chlorthalidone 25mg (am)  -I asked her to speak with her nephrologist regarding benicar dose to make sure he is aware she is taking total 80 mg daily  -hx of nephrectomy and CKD 3b    Stage 3 chronic kidney disease, unspecified whether stage 3a or 3b CKD ([facility] [address]  Baseline GFR 35  -sees dr Hermann Valladares  -s/p rt nephrectomy   -no NSAIDs  -needs good BP control   -improving microalbuminuria 28.2,     Osteopenia of multiple sites  -needs repeat DEXA  -LAST dexa , osteopenia     Hyperglycemia  a1c 6.3%   -discussed the importance of glucose control given CKD    Anxiety  -Self stopped lexapro and doing well    Asthma  Well controlled  -continue Symbicort bid, albuterol asa needed (rarely)  -singular   -sees pulm  -Received Prevnar 13 and Pneumovax 23 (), might need the 20    Allergic rhinitis  - Prefer to avoid long-term Zyrtec which can be associated with cognitive decline. Try Astelin spray    History of hepatitis C  -s/p tx, neg post tx     Irritable bowel syndrome with both constipation and diarrhea  -Continue Bentyl      Orders Placed This Encounter   Procedures    DEXA BONE DENSITY AXIAL SKELETON     Orders Placed This Encounter   Medications    azelastine (ASTELIN) 0.1 % nasal spray     Si sprays by Nasal route 2 times daily Use in each nostril as directed     Dispense:  120 mL     Refill:  1      Medications Discontinued During This Encounter   Medication Reason    escitalopram (LEXAPRO) 20 MG tablet Therapy completed    famotidine (PEPCID) 20 MG tablet Therapy completed    cetirizine (ZYRTEC) 10 MG tablet Therapy completed        Return in about 3 months (around 2022) for Hypertension. HPI  78years old woman with history of hypertension, CKD stage IIIb, s/p right nephrectomy, history of treated HCV, asthma, anxiety, establishing care. Prior patient of Dr. Shannon Arizmendi.     HISTORIES  Current Outpatient Medications on File Prior to Visit   Medication Sig Dispense Refill    chlorthalidone (HYGROTON) 25 MG tablet TAKE ONE TABLET BY MOUTH DAILY 30 tablet 1    dilTIAZem (TIAZAC) 240 MG extended release capsule TAKE ONE CAPSULE BY MOUTH DAILY 90 capsule 1    dicyclomine (BENTYL) 20 MG tablet       metoprolol succinate (TOPROL XL) 25 MG extended release tablet TAKE ONE TABLET BY MOUTH DAILY 90 tablet 1    olmesartan (BENICAR) 40 MG tablet TAKE ONE TABLET BY MOUTH twice daily 60 tablet 5    budesonide-formoterol (SYMBICORT) 160-4.5 MCG/ACT AERO Inhale 2 puffs into the lungs 2 times daily 3 Inhaler 2    montelukast (SINGULAIR) 10 MG tablet Take 1 tablet by mouth daily 90 tablet 3    albuterol sulfate HFA (PROAIR HFA) 108 (90 Base) MCG/ACT inhaler INHALE TWO PUFFS BY MOUTH THREE TIMES A DAY AS NEEDED FOR WHEEZING 2 Inhaler 4     No current facility-administered medications on file prior to visit.         Allergies   Allergen Reactions    Nsaids      Contraindicated w renal disease     Amoxicillin     Sulfa Antibiotics      Past Medical History:   Diagnosis Date    Age-related cataract of both eyes 2/1/2019    Allergic rhinitis, cause unspecified 8/23/2010    Asymptomatic varicose veins 8/23/2010    Calcium oxalate renal stones     Chronic kidney disease, stage II (mild) 8/23/2010    ? due to stones    Dizziness 5/16/2020    Injury of right hand 10/13/2015    Torn ligament and carpal tunnel  Dr. Ransom Snellen at [address] [place] [address] stone on right side 11/11/2011    Leiomyoma of uterus, unspecified 8/23/2010    Obesity, unspecified 8/23/2010    Pain in limb 8/23/2010    Pap smear 7/16/2009    Negative    S/P colonoscopy 10/11/2006    Normal    Screening mammogram 8/4/2009    (Both) Negative    Unspecified asthma(493.90) 08/23/2010    Unspecified essential hypertension 8/23/2010     Patient Active Problem List   Diagnosis    Class 1 obesity due to excess calories with serious comorbidity and body mass index (BMI) of 30.0 to 30.9 in adult    Asthma    Allergic rhinitis    Leiomyoma of uterus    Essential hypertension    Vitamin D deficiency    Atrophic vaginitis    Calcium oxalate renal stones    Chronic sinusitis    Hyperglycemia    History of hepatitis C    Primary osteoarthritis of both knees    Anxiety    Eosinophilia    Primary insomnia    Stage 3 chronic kidney disease, unspecified whether stage 3a or 3b CKD ([address]    Irritable bowel syndrome with both constipation and diarrhea    Osteopenia of multiple sites    S/p nephrectomy     Past Surgical History:   Procedure Laterality Date    COLONOSCOPY  07/05/2016    LITHOTRIPSY      x2 last 1/2011    TOTAL NEPHRECTOMY      4/2013 right due to chronic infection     Social History     Socioeconomic History    Marital status:      Spouse name: Not on file    Number of children: Not on breath. Cardiovascular: Negative for chest pain and leg swelling. PE  Vitals:    06/29/22 0909 06/29/22 0912   BP: (!) 140/90 (!) 142/90   Site: Right Upper Arm Right Upper Arm   Position: Sitting Sitting   Pulse: 94    Temp: 97.4 °F (36.3 °C)    SpO2: 97%    Weight: 205 lb 12.8 oz (93.4 kg)    Height: 5' 7\" (1.702 m)      Estimated body mass index is 32.23 kg/m² as calculated from the following:    Height as of this encounter: 5' 7\" (1.702 m). Weight as of this encounter: 205 lb 12.8 oz (93.4 kg). Physical Exam  Vitals reviewed. Constitutional:       General: She is not in acute distress. Appearance: Normal appearance. She is well-developed. She is not ill-appearing, toxic-appearing or diaphoretic. HENT:      Head: Normocephalic and atraumatic. Eyes:      General: No scleral icterus. Conjunctiva/sclera: Conjunctivae normal.      Pupils: Pupils are equal, round, and reactive to light. Cardiovascular:      Rate and Rhythm: Normal rate and regular rhythm. Heart sounds: Normal heart sounds. Pulmonary:      Effort: Pulmonary effort is normal. No respiratory distress. Breath sounds: Normal breath sounds. Musculoskeletal:      Cervical back: Normal range of motion and neck supple. Right lower leg: No edema. Left lower leg: No edema. Skin:     General: Skin is warm and dry. Coloration: Skin is not jaundiced. Neurological:      Mental Status: She is alert and oriented to person, place, and time.    Psychiatric:         Behavior: Behavior normal.         Immunization History   Administered Date(s) Administered    COVID-19, PFIZER GRAY top, DO NOT Dilute, (age 15 y+), IM, 30 mcg/0.3 mL 04/14/2022    COVID-19, PFIZER PURPLE top, DILUTE for use, (age 15 y+), 30mcg/0.3mL 02/02/2021, 02/23/2021, 10/09/2021    DTP 08/05/2003    DTaP 08/05/2003    Influenza Vaccine, unspecified formulation 10/20/2016, 10/17/2018    Influenza Virus Vaccine 10/13/2015, 10/17/2018, 10/25/2019    Influenza, High Dose (Fluzone 65 yrs and older) 09/30/2011, 10/13/2015, 10/18/2017    Influenza, Quadv, adjuvanted, 65 yrs +, IM, PF (Fluad) 09/16/2020    Pneumococcal Conjugate 13-valent ([id number]) 02/18/2016    Pneumococcal Polysaccharide ([id number]) 02/13/2014    Tdap (Boostrix, Adacel) 02/13/2014    Zoster Live (Zostavax) 09/28/2016    Zoster Recombinant (Shingrix) 06/19/2019, 04/06/2021       Health Maintenance   Topic Date Due    Lipids  06/06/2020    Depression Screen  03/17/2023    Annual Wellness Visit (AWV)  03/18/2023    A1C test (Diabetic or Prediabetic)  06/17/2023    Breast cancer screen  09/13/2023    DTaP/Tdap/Td vaccine (3 - Td or Tdap) 02/13/2024    Colorectal Cancer Screen  07/05/2026    DEXA (modify frequency per FRAX score)  Completed    Flu vaccine  Completed    Shingles vaccine  Completed    Pneumococcal 65+ years Vaccine  Completed    COVID-19 Vaccine  Completed    Hepatitis A vaccine  Aged Out    Hepatitis B vaccine  Aged Out    Hib vaccine  Aged Out    Meningococcal (ACWY) vaccine  Aged Out       PSH, PMH, SH and FH reviewed and noted. Recent and past labs, tests and consults also reviewed. Recent or new meds also reviewed. Trinity Dean MD    This dictation was generated by voice recognition computer software. Although all attempts are made to edit the dictation for accuracy, there may be errors in the transcription that are not intended.

## 2022-06-29 NOTE — ASSESSMENT & PLAN NOTE
Baseline GFR 35  -sees dr Skyla Murray  -s/p rt nephrectomy 2013  -no NSAIDs  -needs good BP control   -improving microalbuminuria 28.2, 6/22

## 2022-06-29 NOTE — ASSESSMENT & PLAN NOTE
High yoday but reportedly 130s at home  -managed by nephrology  - continue diltiazem 240mg (takes 12pm), metoprolol 25 mg (12pm) daily, benicar 40 mg twice a day (per nephrology, above max dose?), chlorthalidone 25mg (am)  -I asked her to speak with her nephrologist regarding benicar dose to make sure he is aware she is taking total 80 mg daily  -hx of nephrectomy and CKD 3b

## 2022-07-26 RX ORDER — OLMESARTAN MEDOXOMIL 40 MG/1
TABLET ORAL
Qty: 90 TABLET | Refills: 1 | Status: SHIPPED | OUTPATIENT
Start: 2022-07-26 | End: 2022-10-25 | Stop reason: SDUPTHER

## 2022-10-03 RX ORDER — DILTIAZEM HYDROCHLORIDE 240 MG/1
240 CAPSULE, EXTENDED RELEASE ORAL DAILY
Qty: 90 CAPSULE | Refills: 1 | Status: SHIPPED | OUTPATIENT
Start: 2022-10-03 | End: 2022-10-25 | Stop reason: ALTCHOICE

## 2022-10-17 RX ORDER — OLMESARTAN MEDOXOMIL 40 MG/1
TABLET ORAL
Qty: 90 TABLET | Refills: 1 | OUTPATIENT
Start: 2022-10-17

## 2022-10-17 NOTE — TELEPHONE ENCOUNTER
Refilled 6 months 2 months ago,  how many is she taking a day? Did she speak with her nephrologist about the dose?

## 2022-10-18 NOTE — TELEPHONE ENCOUNTER
I left the patient a voice mail asking for clarification on her refill request per Dr. Blake Melton. Also how she's taking her medication.

## 2022-10-27 ENCOUNTER — HOSPITAL ENCOUNTER (OUTPATIENT)
Dept: GENERAL RADIOLOGY | Age: 77
Discharge: HOME | End: 2022-10-27
Payer: MEDICARE

## 2022-10-27 DIAGNOSIS — M85.89 OSTEOPENIA OF MULTIPLE SITES: ICD-10-CM

## 2022-10-27 PROCEDURE — 77080 DXA BONE DENSITY AXIAL: CPT

## 2023-01-11 ENCOUNTER — OFFICE VISIT (OUTPATIENT)
Dept: INTERNAL MEDICINE CLINIC | Age: 78
End: 2023-01-11
Payer: MEDICARE

## 2023-01-11 VITALS
DIASTOLIC BLOOD PRESSURE: 80 MMHG | BODY MASS INDEX: 31.64 KG/M2 | WEIGHT: 202 LBS | OXYGEN SATURATION: 98 % | TEMPERATURE: 97.2 F | SYSTOLIC BLOOD PRESSURE: 128 MMHG | HEART RATE: 82 BPM

## 2023-01-11 DIAGNOSIS — N18.30 STAGE 3 CHRONIC KIDNEY DISEASE, UNSPECIFIED WHETHER STAGE 3A OR 3B CKD (HCC): ICD-10-CM

## 2023-01-11 DIAGNOSIS — M85.89 OSTEOPENIA OF MULTIPLE SITES: ICD-10-CM

## 2023-01-11 DIAGNOSIS — J30.9 ALLERGIC RHINITIS, UNSPECIFIED SEASONALITY, UNSPECIFIED TRIGGER: ICD-10-CM

## 2023-01-11 DIAGNOSIS — R73.03 PREDIABETES: Primary | ICD-10-CM

## 2023-01-11 DIAGNOSIS — I10 ESSENTIAL HYPERTENSION: ICD-10-CM

## 2023-01-11 DIAGNOSIS — N18.32 STAGE 3B CHRONIC KIDNEY DISEASE (HCC): ICD-10-CM

## 2023-01-11 PROCEDURE — 1123F ACP DISCUSS/DSCN MKR DOCD: CPT | Performed by: INTERNAL MEDICINE

## 2023-01-11 PROCEDURE — 99214 OFFICE O/P EST MOD 30 MIN: CPT | Performed by: INTERNAL MEDICINE

## 2023-01-11 PROCEDURE — 3079F DIAST BP 80-89 MM HG: CPT | Performed by: INTERNAL MEDICINE

## 2023-01-11 PROCEDURE — 3074F SYST BP LT 130 MM HG: CPT | Performed by: INTERNAL MEDICINE

## 2023-01-11 SDOH — HEALTH STABILITY: PHYSICAL HEALTH: ON AVERAGE, HOW MANY DAYS PER WEEK DO YOU ENGAGE IN MODERATE TO STRENUOUS EXERCISE (LIKE A BRISK WALK)?: 4 DAYS

## 2023-01-11 SDOH — HEALTH STABILITY: PHYSICAL HEALTH: ON AVERAGE, HOW MANY MINUTES DO YOU ENGAGE IN EXERCISE AT THIS LEVEL?: 30 MIN

## 2023-01-11 ASSESSMENT — PATIENT HEALTH QUESTIONNAIRE - PHQ9
SUM OF ALL RESPONSES TO PHQ QUESTIONS 1-9: 0
2. FEELING DOWN, DEPRESSED OR HOPELESS: 0
SUM OF ALL RESPONSES TO PHQ QUESTIONS 1-9: 0
SUM OF ALL RESPONSES TO PHQ9 QUESTIONS 1 & 2: 0
1. LITTLE INTEREST OR PLEASURE IN DOING THINGS: 0

## 2023-01-11 ASSESSMENT — LIFESTYLE VARIABLES
HOW OFTEN DO YOU HAVE A DRINK CONTAINING ALCOHOL: MONTHLY OR LESS
HOW MANY STANDARD DRINKS CONTAINING ALCOHOL DO YOU HAVE ON A TYPICAL DAY: 1
HOW OFTEN DO YOU HAVE SIX OR MORE DRINKS ON ONE OCCASION: 1
HOW MANY STANDARD DRINKS CONTAINING ALCOHOL DO YOU HAVE ON A TYPICAL DAY: 1 OR 2
HOW OFTEN DO YOU HAVE A DRINK CONTAINING ALCOHOL: 2

## 2023-01-11 ASSESSMENT — ENCOUNTER SYMPTOMS: SHORTNESS OF BREATH: 0

## 2023-01-11 NOTE — ASSESSMENT & PLAN NOTE
Now well controlled, recent med change done by nephro  -managed by nephrology  -on metoprolol 50 mg daily, benicar 40 mg daily, chlorthalidone 25mg   -hx of nephrectomy and CKD 3b

## 2023-01-11 NOTE — PROGRESS NOTES
[facility]  Follow up visit   2023    Ben Mata (:  1945) is a 78 y.o. female, here for evaluation of the following medical concerns:    Chief Complaint   Patient presents with    Medicare AWV        ASSESSMENT/ PLAN:  Essential hypertension  Now well controlled, recent med change done by nephro  -managed by nephrology  -on metoprolol 50 mg daily, benicar 40 mg daily, chlorthalidone 25mg   -hx of nephrectomy and CKD 3b    Stage 3 chronic kidney disease, unspecified whether stage 3a or 3b CKD ([facility] [address]  Baseline GFR 35  -sees dr Jamelle Mcardle  -s/p rt nephrectomy   -no NSAIDs  -needs good BP control   -improving microalbuminuria 28.2,   -repeat BMP and urine protein ordered by nephro she will get next month     Osteopenia of multiple sites  -repeat DEXA 10/22 showed bone density improved, within normal range  -continue vit D and calcium 1200 mg from diet. Prediabetes  -a1c 6.3% , repeat now  -discussed the importance of glucose control given CKD    Allergic rhinitis  -doing ok without zyrtec and uses Astelin prn. Will continue        Orders Placed This Encounter   Procedures    Hemoglobin A1C     No orders of the defined types were placed in this encounter. There are no discontinued medications. No follow-ups on file. HPI  Here for f/u, doing well. Under a lot of stress with daughter health. Tolerating meds well. . no cp, dyspnea, mild leg swelling.       HISTORIES   Current Outpatient Medications on File Prior to Visit   Medication Sig Dispense Refill    olmesartan (BENICAR) 40 MG tablet TAKE ONE TABLET BY MOUTH 90 tablet 1    metoprolol succinate (TOPROL XL) 50 MG extended release tablet Take 1 tablet by mouth daily 90 tablet 5    chlorthalidone (HYGROTON) 25 MG tablet TAKE ONE TABLET BY MOUTH DAILY 30 tablet 5    azelastine (ASTELIN) 0.1 % nasal spray 2 sprays by Nasal route 2 times daily Use in each nostril as directed 120 mL 1    budesonide-formoterol (SYMBICORT) 160-4.5 MCG/ACT AERO Inhale 2 puffs into the lungs 2 times daily 3 Inhaler 2    montelukast (SINGULAIR) 10 MG tablet Take 1 tablet by mouth daily 90 tablet 3    albuterol sulfate HFA (PROAIR HFA) 108 (90 Base) MCG/ACT inhaler INHALE TWO PUFFS BY MOUTH THREE TIMES A DAY AS NEEDED FOR WHEEZING 2 Inhaler 4    dicyclomine (BENTYL) 20 MG tablet  (Patient not taking: Reported on 1/11/2023)       No current facility-administered medications on file prior to visit.        Allergies   Allergen Reactions    Nsaids      Contraindicated w renal disease     Amoxicillin     Sulfa Antibiotics      Past Medical History:   Diagnosis Date    Age-related cataract of both eyes 2/1/2019    Allergic rhinitis, cause unspecified 8/23/2010    Asymptomatic varicose veins 8/23/2010    Calcium oxalate renal stones     Chronic kidney disease, stage II (mild) 8/23/2010    ? due to stones    Dizziness 5/16/2020    Injury of right hand 10/13/2015    Torn ligament and carpal tunnel  Dr. Almonte Fail at [facility]    Kidney stone on right side 11/11/2011    Leiomyoma of uterus, unspecified 8/23/2010    Obesity, unspecified 8/23/2010    Pain in limb 8/23/2010    Pap smear 7/16/2009    Negative    S/P colonoscopy 10/11/2006    Normal    Screening mammogram 8/4/2009    (Both) Negative    Unspecified asthma(493.90) 08/23/2010    Unspecified essential hypertension 8/23/2010     Patient Active Problem List   Diagnosis    Class 1 obesity due to excess calories with serious comorbidity and body mass index (BMI) of 30.0 to 30.9 in adult    Asthma    Allergic rhinitis    Leiomyoma of uterus    Essential hypertension    Vitamin D deficiency    Atrophic vaginitis    Calcium oxalate renal stones    Chronic sinusitis    Prediabetes    History of hepatitis C    Primary osteoarthritis of both knees    Anxiety    Eosinophilia    Primary insomnia    Stage 3 chronic kidney disease, unspecified whether stage 3a or 3b CKD (HCC)    Irritable bowel syndrome with both constipation and diarrhea    Osteopenia of multiple sites    S/p nephrectomy     Past Surgical History:   Procedure Laterality Date    COLONOSCOPY  07/05/2016    LITHOTRIPSY      x2 last 1/2011    TOTAL NEPHRECTOMY      4/2013 right due to chronic infection     Social History     Socioeconomic History    Marital status:      Spouse name: Not on file    Number of children: Not on file    Years of education: Not on file    Highest education level: Not on file   Occupational History    Not on file   Tobacco Use    Smoking status: Never    Smokeless tobacco: Never   Substance and Sexual Activity    Alcohol use: No    Drug use: No    Sexual activity: Not on file   Other Topics Concern    Not on file   Social History Narrative    Not on file     Social Determinants of Health     Financial Resource Strain: Low Risk     Difficulty of Paying Living Expenses: Not hard at all   Food Insecurity: No Food Insecurity    Worried About Running Out of Food in the Last Year: Never true    Dinh of Food in the Last Year: Never true   Transportation Needs: Not on file   Physical Activity: Insufficiently Active    Days of Exercise per Week: 4 days    Minutes of Exercise per Session: 30 min   Stress: Not on file   Social Connections: Not on file   Intimate Partner Violence: Not on file   Housing Stability: Not on file      Family History   Problem Relation Age of Onset    Hypertension Mother     Hypertension Father     Stroke Father     Heart Disease Sister     Prostate Cancer Brother        ROS  Review of Systems   Respiratory:  Negative for shortness of breath. Cardiovascular:  Negative for chest pain and leg swelling. PE  Vitals:    01/11/23 1242   BP: 128/80   Pulse: 82   Temp: 97.2 °F (36.2 °C)   SpO2: 98%   Weight: 202 lb (91.6 kg)     Estimated body mass index is 31.64 kg/m² as calculated from the following:    Height as of 6/29/22: 5' 7\" (1.702 m). Weight as of this encounter: 202 lb (91.6 kg).     Physical Exam  Vitals reviewed. Constitutional:       General: She is not in acute distress. Appearance: Normal appearance. She is not ill-appearing, toxic-appearing or diaphoretic. HENT:      Head: Normocephalic and atraumatic. Eyes:      Conjunctiva/sclera: Conjunctivae normal.      Pupils: Pupils are equal, round, and reactive to light. Cardiovascular:      Rate and Rhythm: Normal rate and regular rhythm. Heart sounds: Normal heart sounds. Pulmonary:      Effort: Pulmonary effort is normal. No respiratory distress. Breath sounds: Normal breath sounds. Musculoskeletal:      Cervical back: Normal range of motion and neck supple. Right lower leg: No edema. Left lower leg: No edema. Skin:     General: Skin is warm and dry. Neurological:      Mental Status: She is alert and oriented to person, place, and time. Jef Amador MD    This dictation was generated by voice recognition computer software. Although all attempts are made to edit the dictation for accuracy, there may be errors in the transcription that are not intended.

## 2023-01-11 NOTE — ASSESSMENT & PLAN NOTE
-repeat DEXA 10/22 showed bone density improved, within normal range  -continue vit D and calcium 1200 mg from diet.

## 2023-01-11 NOTE — ASSESSMENT & PLAN NOTE
Baseline GFR 35  -sees dr Senait Meza  -s/p rt nephrectomy 2013  -no NSAIDs  -needs good BP control   -improving microalbuminuria 28.2, 6/22  -repeat BMP and urine protein ordered by nephro she will get next month

## 2023-04-17 SDOH — ECONOMIC STABILITY: FOOD INSECURITY: WITHIN THE PAST 12 MONTHS, THE FOOD YOU BOUGHT JUST DIDN'T LAST AND YOU DIDN'T HAVE MONEY TO GET MORE.: NEVER TRUE

## 2023-04-17 SDOH — ECONOMIC STABILITY: HOUSING INSECURITY
IN THE LAST 12 MONTHS, WAS THERE A TIME WHEN YOU DID NOT HAVE A STEADY PLACE TO SLEEP OR SLEPT IN A SHELTER (INCLUDING NOW)?: NO

## 2023-04-17 SDOH — ECONOMIC STABILITY: TRANSPORTATION INSECURITY
IN THE PAST 12 MONTHS, HAS LACK OF TRANSPORTATION KEPT YOU FROM MEETINGS, WORK, OR FROM GETTING THINGS NEEDED FOR DAILY LIVING?: NO

## 2023-04-17 SDOH — ECONOMIC STABILITY: FOOD INSECURITY: WITHIN THE PAST 12 MONTHS, YOU WORRIED THAT YOUR FOOD WOULD RUN OUT BEFORE YOU GOT MONEY TO BUY MORE.: NEVER TRUE

## 2023-04-17 SDOH — ECONOMIC STABILITY: INCOME INSECURITY: HOW HARD IS IT FOR YOU TO PAY FOR THE VERY BASICS LIKE FOOD, HOUSING, MEDICAL CARE, AND HEATING?: NOT HARD AT ALL

## 2023-04-20 ENCOUNTER — OFFICE VISIT (OUTPATIENT)
Dept: INTERNAL MEDICINE CLINIC | Age: 78
End: 2023-04-20
Payer: MEDICARE

## 2023-04-20 VITALS
TEMPERATURE: 97.3 F | HEART RATE: 84 BPM | SYSTOLIC BLOOD PRESSURE: 145 MMHG | HEIGHT: 67 IN | OXYGEN SATURATION: 97 % | BODY MASS INDEX: 31.74 KG/M2 | DIASTOLIC BLOOD PRESSURE: 85 MMHG | WEIGHT: 202.2 LBS

## 2023-04-20 DIAGNOSIS — J45.901 ASTHMA WITH ACUTE EXACERBATION, UNSPECIFIED ASTHMA SEVERITY, UNSPECIFIED WHETHER PERSISTENT: ICD-10-CM

## 2023-04-20 DIAGNOSIS — E78.5 HYPERLIPIDEMIA, UNSPECIFIED HYPERLIPIDEMIA TYPE: Primary | ICD-10-CM

## 2023-04-20 DIAGNOSIS — I10 ESSENTIAL HYPERTENSION: ICD-10-CM

## 2023-04-20 PROCEDURE — 3077F SYST BP >= 140 MM HG: CPT | Performed by: INTERNAL MEDICINE

## 2023-04-20 PROCEDURE — 1123F ACP DISCUSS/DSCN MKR DOCD: CPT | Performed by: INTERNAL MEDICINE

## 2023-04-20 PROCEDURE — 3079F DIAST BP 80-89 MM HG: CPT | Performed by: INTERNAL MEDICINE

## 2023-04-20 PROCEDURE — 99214 OFFICE O/P EST MOD 30 MIN: CPT | Performed by: INTERNAL MEDICINE

## 2023-04-20 RX ORDER — PREDNISONE 20 MG/1
40 TABLET ORAL DAILY
Qty: 10 TABLET | Refills: 0 | Status: SHIPPED | OUTPATIENT
Start: 2023-04-20 | End: 2023-04-25

## 2023-04-20 RX ORDER — METOPROLOL SUCCINATE 50 MG/1
50 TABLET, EXTENDED RELEASE ORAL DAILY
Qty: 90 TABLET | Refills: 1 | Status: CANCELLED | OUTPATIENT
Start: 2023-04-20

## 2023-04-20 ASSESSMENT — PATIENT HEALTH QUESTIONNAIRE - PHQ9
SUM OF ALL RESPONSES TO PHQ QUESTIONS 1-9: 0
2. FEELING DOWN, DEPRESSED OR HOPELESS: 0
SUM OF ALL RESPONSES TO PHQ9 QUESTIONS 1 & 2: 0
SUM OF ALL RESPONSES TO PHQ QUESTIONS 1-9: 0
1. LITTLE INTEREST OR PLEASURE IN DOING THINGS: 0
SUM OF ALL RESPONSES TO PHQ QUESTIONS 1-9: 0
SUM OF ALL RESPONSES TO PHQ QUESTIONS 1-9: 0

## 2023-04-20 ASSESSMENT — ENCOUNTER SYMPTOMS
SHORTNESS OF BREATH: 1
WHEEZING: 1

## 2023-04-20 NOTE — ASSESSMENT & PLAN NOTE
3601 Ruben Wallis to speak to pt.  Alirio Diallo Now with likely acute exacerbation due to allergies for the last 3 days using albuterol twice daily due to allergies and wheezing with mild dyspnea. No wheezing on exam today but she took albuterol prior to our visit.   -Will treat with prednisone 5 days  -continue Symbicort bid, albuterol sa needed   -singular   -Continue to see pulm regularly

## 2023-04-20 NOTE — PROGRESS NOTES
osteoarthritis of both knees    Anxiety    Eosinophilia    Primary insomnia    Stage 3 chronic kidney disease, unspecified whether stage 3a or 3b CKD (HCC)    Irritable bowel syndrome with both constipation and diarrhea    Osteopenia of multiple sites    S/p nephrectomy     Past Surgical History:   Procedure Laterality Date    COLONOSCOPY  07/05/2016    LITHOTRIPSY      x2 last 1/2011    TOTAL NEPHRECTOMY      4/2013 right due to chronic infection     Social History     Socioeconomic History    Marital status:      Spouse name: Not on file    Number of children: Not on file    Years of education: Not on file    Highest education level: Not on file   Occupational History    Not on file   Tobacco Use    Smoking status: Never    Smokeless tobacco: Never   Substance and Sexual Activity    Alcohol use: No    Drug use: No    Sexual activity: Not on file   Other Topics Concern    Not on file   Social History Narrative    Not on file     Social Determinants of Health     Financial Resource Strain: Low Risk     Difficulty of Paying Living Expenses: Not hard at all   Food Insecurity: No Food Insecurity    Worried About Running Out of Food in the Last Year: Never true    Ran Out of Food in the Last Year: Never true   Transportation Needs: Unknown    Lack of Transportation (Medical): Not on file    Lack of Transportation (Non-Medical):  No   Physical Activity: Insufficiently Active    Days of Exercise per Week: 4 days    Minutes of Exercise per Session: 30 min   Stress: Not on file   Social Connections: Not on file   Intimate Partner Violence: Not on file   Housing Stability: Unknown    Unable to Pay for Housing in the Last Year: Not on file    Number of Places Lived in the Last Year: Not on file    Unstable Housing in the Last Year: No      Family History   Problem Relation Age of Onset    Hypertension Mother     Hypertension Father     Stroke Father     Heart Disease Sister     Prostate Cancer Brother

## 2023-04-20 NOTE — ASSESSMENT & PLAN NOTE
Higher than goal today 145/85, but acutely sick there for will not make changes now  -on metoprolol 50 mg daily, benicar 40 mg daily, chlorthalidone 25mg, amlodipine 5 mg   -hx of nephrectomy and CKD 3b

## 2023-07-06 ENCOUNTER — OFFICE VISIT (OUTPATIENT)
Dept: INTERNAL MEDICINE CLINIC | Age: 78
End: 2023-07-06
Payer: MEDICARE

## 2023-07-06 VITALS
TEMPERATURE: 97.7 F | DIASTOLIC BLOOD PRESSURE: 80 MMHG | OXYGEN SATURATION: 97 % | BODY MASS INDEX: 33.29 KG/M2 | HEART RATE: 85 BPM | WEIGHT: 199.8 LBS | HEIGHT: 65 IN | SYSTOLIC BLOOD PRESSURE: 130 MMHG

## 2023-07-06 DIAGNOSIS — Z00.00 MEDICARE ANNUAL WELLNESS VISIT, SUBSEQUENT: Primary | ICD-10-CM

## 2023-07-06 DIAGNOSIS — I10 ESSENTIAL HYPERTENSION: ICD-10-CM

## 2023-07-06 DIAGNOSIS — R41.3 MEMORY CHANGE: ICD-10-CM

## 2023-07-06 DIAGNOSIS — R73.03 PREDIABETES: ICD-10-CM

## 2023-07-06 PROCEDURE — 1123F ACP DISCUSS/DSCN MKR DOCD: CPT | Performed by: INTERNAL MEDICINE

## 2023-07-06 PROCEDURE — G0439 PPPS, SUBSEQ VISIT: HCPCS | Performed by: INTERNAL MEDICINE

## 2023-07-06 PROCEDURE — 3079F DIAST BP 80-89 MM HG: CPT | Performed by: INTERNAL MEDICINE

## 2023-07-06 PROCEDURE — 3075F SYST BP GE 130 - 139MM HG: CPT | Performed by: INTERNAL MEDICINE

## 2023-07-06 SDOH — HEALTH STABILITY: PHYSICAL HEALTH: ON AVERAGE, HOW MANY DAYS PER WEEK DO YOU ENGAGE IN MODERATE TO STRENUOUS EXERCISE (LIKE A BRISK WALK)?: 4 DAYS

## 2023-07-06 SDOH — HEALTH STABILITY: PHYSICAL HEALTH: ON AVERAGE, HOW MANY MINUTES DO YOU ENGAGE IN EXERCISE AT THIS LEVEL?: 30 MIN

## 2023-07-06 ASSESSMENT — PATIENT HEALTH QUESTIONNAIRE - PHQ9
1. LITTLE INTEREST OR PLEASURE IN DOING THINGS: 0
SUM OF ALL RESPONSES TO PHQ QUESTIONS 1-9: 2
SUM OF ALL RESPONSES TO PHQ QUESTIONS 1-9: 2
2. FEELING DOWN, DEPRESSED OR HOPELESS: 2
SUM OF ALL RESPONSES TO PHQ QUESTIONS 1-9: 2
SUM OF ALL RESPONSES TO PHQ9 QUESTIONS 1 & 2: 2
SUM OF ALL RESPONSES TO PHQ QUESTIONS 1-9: 2

## 2023-07-06 ASSESSMENT — LIFESTYLE VARIABLES
HOW MANY STANDARD DRINKS CONTAINING ALCOHOL DO YOU HAVE ON A TYPICAL DAY: 1 OR 2
HOW OFTEN DO YOU HAVE A DRINK CONTAINING ALCOHOL: 2
HOW OFTEN DO YOU HAVE A DRINK CONTAINING ALCOHOL: MONTHLY OR LESS
HOW OFTEN DO YOU HAVE SIX OR MORE DRINKS ON ONE OCCASION: 1
HOW MANY STANDARD DRINKS CONTAINING ALCOHOL DO YOU HAVE ON A TYPICAL DAY: 1

## 2023-07-06 NOTE — ASSESSMENT & PLAN NOTE
Well controlled   -continue metoprolol 50 mg daily, benicar 40 mg daily, chlorthalidone 25mg, amlodipine 5 mg   -hx of nephrectomy and CKD 3b

## 2023-08-24 RX ORDER — PREDNISONE 20 MG/1
40 TABLET ORAL DAILY
Qty: 10 TABLET | Refills: 0 | OUTPATIENT
Start: 2023-08-24 | End: 2023-08-29

## 2023-10-12 ENCOUNTER — TELEPHONE (OUTPATIENT)
Dept: INTERNAL MEDICINE CLINIC | Age: 78
End: 2023-10-12

## 2023-10-12 NOTE — TELEPHONE ENCOUNTER
----- Message from Dennise Padmini sent at 10/10/2023  8:11 AM EDT -----  Subject: Appointment Request    Reason for Call: New Patient/New to Provider Appointment needed: Routine   Existing Condition Follow Up    QUESTIONS    Reason for appointment request? No appointments available during search     Additional Information for Provider? Pt needs a callback to r/s f/u appt   with her daughter.    ---------------------------------------------------------------------------  --------------  Connie BROWN  [phone]; OK to leave message on voicemail  ---------------------------------------------------------------------------  --------------  SCRIPT ANSWERS

## 2024-03-20 DIAGNOSIS — N18.32 STAGE 3B CHRONIC KIDNEY DISEASE (HCC): ICD-10-CM

## 2024-03-20 LAB
ALBUMIN SERPL-MCNC: 4.7 G/DL (ref 3.4–5)
ANION GAP SERPL CALCULATED.3IONS-SCNC: 11 MMOL/L (ref 3–16)
BUN SERPL-MCNC: 34 MG/DL (ref 7–20)
CALCIUM SERPL-MCNC: 10 MG/DL (ref 8.3–10.6)
CHLORIDE SERPL-SCNC: 106 MMOL/L (ref 99–110)
CO2 SERPL-SCNC: 25 MMOL/L (ref 21–32)
CREAT SERPL-MCNC: 1.5 MG/DL (ref 0.6–1.2)
CREAT UR-MCNC: 61.7 MG/DL (ref 28–259)
GFR SERPLBLD CREATININE-BSD FMLA CKD-EPI: 36 ML/MIN/{1.73_M2}
GLUCOSE SERPL-MCNC: 104 MG/DL (ref 70–99)
MICROALBUMIN UR DL<=1MG/L-MCNC: 3.3 MG/DL
MICROALBUMIN/CREAT UR: 53.5 MG/G (ref 0–30)
PHOSPHATE SERPL-MCNC: 3.5 MG/DL (ref 2.5–4.9)
POTASSIUM SERPL-SCNC: 4.6 MMOL/L (ref 3.5–5.1)
SODIUM SERPL-SCNC: 142 MMOL/L (ref 136–145)

## 2024-05-27 SDOH — ECONOMIC STABILITY: INCOME INSECURITY: HOW HARD IS IT FOR YOU TO PAY FOR THE VERY BASICS LIKE FOOD, HOUSING, MEDICAL CARE, AND HEATING?: NOT HARD AT ALL

## 2024-05-27 SDOH — ECONOMIC STABILITY: HOUSING INSECURITY
IN THE LAST 12 MONTHS, WAS THERE A TIME WHEN YOU DID NOT HAVE A STEADY PLACE TO SLEEP OR SLEPT IN A SHELTER (INCLUDING NOW)?: YES

## 2024-05-27 SDOH — ECONOMIC STABILITY: FOOD INSECURITY: WITHIN THE PAST 12 MONTHS, THE FOOD YOU BOUGHT JUST DIDN'T LAST AND YOU DIDN'T HAVE MONEY TO GET MORE.: NEVER TRUE

## 2024-05-27 SDOH — ECONOMIC STABILITY: FOOD INSECURITY: WITHIN THE PAST 12 MONTHS, YOU WORRIED THAT YOUR FOOD WOULD RUN OUT BEFORE YOU GOT MONEY TO BUY MORE.: NEVER TRUE

## 2024-05-27 ASSESSMENT — PATIENT HEALTH QUESTIONNAIRE - PHQ9
SUM OF ALL RESPONSES TO PHQ9 QUESTIONS 1 & 2: 0
SUM OF ALL RESPONSES TO PHQ QUESTIONS 1-9: 0
SUM OF ALL RESPONSES TO PHQ QUESTIONS 1-9: 0
1. LITTLE INTEREST OR PLEASURE IN DOING THINGS: NOT AT ALL
SUM OF ALL RESPONSES TO PHQ9 QUESTIONS 1 & 2: 0
2. FEELING DOWN, DEPRESSED OR HOPELESS: NOT AT ALL
2. FEELING DOWN, DEPRESSED OR HOPELESS: NOT AT ALL
SUM OF ALL RESPONSES TO PHQ QUESTIONS 1-9: 0
1. LITTLE INTEREST OR PLEASURE IN DOING THINGS: NOT AT ALL
SUM OF ALL RESPONSES TO PHQ QUESTIONS 1-9: 0

## 2024-05-28 DIAGNOSIS — R41.3 MEMORY CHANGE: ICD-10-CM

## 2024-05-29 ENCOUNTER — OFFICE VISIT (OUTPATIENT)
Dept: INTERNAL MEDICINE CLINIC | Age: 79
End: 2024-05-29
Payer: MEDICARE

## 2024-05-29 VITALS
BODY MASS INDEX: 34.64 KG/M2 | WEIGHT: 205 LBS | HEART RATE: 49 BPM | DIASTOLIC BLOOD PRESSURE: 84 MMHG | SYSTOLIC BLOOD PRESSURE: 136 MMHG | OXYGEN SATURATION: 100 % | TEMPERATURE: 97.5 F

## 2024-05-29 DIAGNOSIS — E53.8 B12 DEFICIENCY: ICD-10-CM

## 2024-05-29 DIAGNOSIS — I10 ESSENTIAL HYPERTENSION: ICD-10-CM

## 2024-05-29 DIAGNOSIS — N18.30 STAGE 3 CHRONIC KIDNEY DISEASE, UNSPECIFIED WHETHER STAGE 3A OR 3B CKD (HCC): Primary | ICD-10-CM

## 2024-05-29 DIAGNOSIS — J45.901 ASTHMA WITH ACUTE EXACERBATION, UNSPECIFIED ASTHMA SEVERITY, UNSPECIFIED WHETHER PERSISTENT: ICD-10-CM

## 2024-05-29 DIAGNOSIS — R73.03 PREDIABETES: ICD-10-CM

## 2024-05-29 PROBLEM — D72.10 EOSINOPHILIA: Status: RESOLVED | Noted: 2020-09-16 | Resolved: 2024-05-29

## 2024-05-29 LAB
FOLATE SERPL-MCNC: 9.05 NG/ML (ref 4.78–24.2)
VIT B12 SERPL-MCNC: 302 PG/ML (ref 211–911)

## 2024-05-29 PROCEDURE — 1123F ACP DISCUSS/DSCN MKR DOCD: CPT | Performed by: INTERNAL MEDICINE

## 2024-05-29 PROCEDURE — 3079F DIAST BP 80-89 MM HG: CPT | Performed by: INTERNAL MEDICINE

## 2024-05-29 PROCEDURE — 99214 OFFICE O/P EST MOD 30 MIN: CPT | Performed by: INTERNAL MEDICINE

## 2024-05-29 PROCEDURE — 3075F SYST BP GE 130 - 139MM HG: CPT | Performed by: INTERNAL MEDICINE

## 2024-05-29 RX ORDER — MELOXICAM 15 MG/1
15 TABLET ORAL DAILY PRN
COMMUNITY
Start: 2024-05-06 | End: 2024-05-29 | Stop reason: ALTCHOICE

## 2024-05-29 ASSESSMENT — ENCOUNTER SYMPTOMS: SHORTNESS OF BREATH: 0

## 2024-05-29 NOTE — PATIENT INSTRUCTIONS
NO NSAIDs!!  Ok for Voltaren gel and tylenol    Start b12 1000 mcg daily  Repeat labs fasting before next visit, do not take b12 supplement on testing morning

## 2024-05-29 NOTE — PROGRESS NOTES
[facility]  Follow up visit   2024    Cecy Nina (:  1945) is a 78 y.o. female, here for evaluation of the following medical concerns:    Chief Complaint   Patient presents with    Hypertension        ASSESSMENT/ PLAN:  Stage 3 chronic kidney disease, unspecified whether stage 3a or 3b CKD (Formerly [facility])  Baseline GFR 35  -sees dr townsend  -s/p rt nephrectomy   -no NSAIDs  -needs good BP control   -microalbuminuria 53, 3/24  -recently was treated with meloxicam by ortho, explained today again should avoid NSAIDs completely. Stop meloxicam. Can use tylenol 1000 mg, ok to use Voltaren gel      B12 deficiency  -low b12 level, start 1000 mcg daily     Essential hypertension  Well controlled   -continue metoprolol 50 mg daily, benicar 40 mg daily, chlorthalidone 25mg, amlodipine 5 mg   -hx of nephrectomy and CKD 3b    Asthma  stable  -continue dulera bid, albuterol as needed   -singular   -Continue to see pulm regularly    Prediabetes  -a1c 6.2% , repeat before next visit   -discussed the importance of glucose control given CKD      Orders Placed This Encounter   Procedures    CBC with Auto Differential    Comprehensive Metabolic Panel    Lipid Panel    Hemoglobin A1C    Vitamin B12 & Folate     No orders of the defined types were placed in this encounter.     Medications Discontinued During This Encounter   Medication Reason    budesonide-formoterol (SYMBICORT) 160-4.5 MCG/ACT AERO Therapy completed    dicyclomine (BENTYL) 20 MG tablet Therapy completed    meloxicam (MOBIC) 15 MG tablet Therapy completed        No follow-ups on file.    HPI  Here for follow-up.  Overall doing well.  Recently saw Ortho for knee pain and was prescribed meloxicam for 30 days.    HISTORIES   Current Outpatient Medications on File Prior to Visit   Medication Sig Dispense Refill    amLODIPine (NORVASC) 5 MG tablet Take 1 tablet by mouth daily 90 tablet 1    metoprolol succinate (TOPROL XL) 50 MG extended

## 2024-05-29 NOTE — ASSESSMENT & PLAN NOTE
Baseline GFR 35  -sees dr townsend  -s/p rt nephrectomy 2013  -no NSAIDs  -needs good BP control   -microalbuminuria 53, 3/24  -recently was treated with meloxicam by ortho, explained today again should avoid NSAIDs completely. Stop meloxicam. Can use tylenol 1000 mg, ok to use Voltaren gel

## 2024-10-08 DIAGNOSIS — E53.8 B12 DEFICIENCY: ICD-10-CM

## 2024-10-08 DIAGNOSIS — I10 ESSENTIAL HYPERTENSION: ICD-10-CM

## 2024-10-08 DIAGNOSIS — N18.30 STAGE 3 CHRONIC KIDNEY DISEASE, UNSPECIFIED WHETHER STAGE 3A OR 3B CKD (HCC): ICD-10-CM

## 2024-10-08 DIAGNOSIS — R73.03 PREDIABETES: ICD-10-CM

## 2024-10-09 LAB
ALBUMIN SERPL-MCNC: 4.7 G/DL (ref 3.4–5)
ALBUMIN/GLOB SERPL: 1.9 {RATIO} (ref 1.1–2.2)
ALP SERPL-CCNC: 92 U/L (ref 40–129)
ALT SERPL-CCNC: 19 U/L (ref 10–40)
ANION GAP SERPL CALCULATED.3IONS-SCNC: 11 MMOL/L (ref 3–16)
AST SERPL-CCNC: 22 U/L (ref 15–37)
BASOPHILS # BLD: 0.1 K/UL (ref 0–0.2)
BASOPHILS NFR BLD: 1.3 %
BILIRUB SERPL-MCNC: 0.5 MG/DL (ref 0–1)
BUN SERPL-MCNC: 32 MG/DL (ref 7–20)
CALCIUM SERPL-MCNC: 10.5 MG/DL (ref 8.3–10.6)
CHLORIDE SERPL-SCNC: 104 MMOL/L (ref 99–110)
CHOLEST SERPL-MCNC: 197 MG/DL (ref 0–199)
CO2 SERPL-SCNC: 25 MMOL/L (ref 21–32)
CREAT SERPL-MCNC: 1.5 MG/DL (ref 0.6–1.2)
DEPRECATED RDW RBC AUTO: 13.5 % (ref 12.4–15.4)
EOSINOPHIL # BLD: 0.2 K/UL (ref 0–0.6)
EOSINOPHIL NFR BLD: 4.3 %
EST. AVERAGE GLUCOSE BLD GHB EST-MCNC: 119.8 MG/DL
FOLATE SERPL-MCNC: 7.81 NG/ML (ref 4.78–24.2)
GFR SERPLBLD CREATININE-BSD FMLA CKD-EPI: 36 ML/MIN/{1.73_M2}
GLUCOSE SERPL-MCNC: 104 MG/DL (ref 70–99)
HBA1C MFR BLD: 5.8 %
HCT VFR BLD AUTO: 39.4 % (ref 36–48)
HDLC SERPL-MCNC: 47 MG/DL (ref 40–60)
HGB BLD-MCNC: 12.9 G/DL (ref 12–16)
LDLC SERPL CALC-MCNC: 135 MG/DL
LYMPHOCYTES # BLD: 1.5 K/UL (ref 1–5.1)
LYMPHOCYTES NFR BLD: 33.8 %
MCH RBC QN AUTO: 31.9 PG (ref 26–34)
MCHC RBC AUTO-ENTMCNC: 32.7 G/DL (ref 31–36)
MCV RBC AUTO: 97.7 FL (ref 80–100)
MONOCYTES # BLD: 0.3 K/UL (ref 0–1.3)
MONOCYTES NFR BLD: 7.4 %
NEUTROPHILS # BLD: 2.4 K/UL (ref 1.7–7.7)
NEUTROPHILS NFR BLD: 53.2 %
PLATELET # BLD AUTO: 171 K/UL (ref 135–450)
PMV BLD AUTO: 11.4 FL (ref 5–10.5)
POTASSIUM SERPL-SCNC: 4.7 MMOL/L (ref 3.5–5.1)
PROT SERPL-MCNC: 7.2 G/DL (ref 6.4–8.2)
RBC # BLD AUTO: 4.04 M/UL (ref 4–5.2)
SODIUM SERPL-SCNC: 140 MMOL/L (ref 136–145)
TRIGL SERPL-MCNC: 74 MG/DL (ref 0–150)
VIT B12 SERPL-MCNC: 858 PG/ML (ref 211–911)
VLDLC SERPL CALC-MCNC: 15 MG/DL
WBC # BLD AUTO: 4.6 K/UL (ref 4–11)

## 2024-10-25 SDOH — HEALTH STABILITY: PHYSICAL HEALTH: ON AVERAGE, HOW MANY MINUTES DO YOU ENGAGE IN EXERCISE AT THIS LEVEL?: 30 MIN

## 2024-10-25 SDOH — HEALTH STABILITY: PHYSICAL HEALTH: ON AVERAGE, HOW MANY DAYS PER WEEK DO YOU ENGAGE IN MODERATE TO STRENUOUS EXERCISE (LIKE A BRISK WALK)?: 2 DAYS

## 2024-10-25 ASSESSMENT — PATIENT HEALTH QUESTIONNAIRE - PHQ9
SUM OF ALL RESPONSES TO PHQ QUESTIONS 1-9: 2
SUM OF ALL RESPONSES TO PHQ QUESTIONS 1-9: 2
2. FEELING DOWN, DEPRESSED OR HOPELESS: SEVERAL DAYS
SUM OF ALL RESPONSES TO PHQ QUESTIONS 1-9: 2
SUM OF ALL RESPONSES TO PHQ9 QUESTIONS 1 & 2: 2
1. LITTLE INTEREST OR PLEASURE IN DOING THINGS: SEVERAL DAYS
SUM OF ALL RESPONSES TO PHQ QUESTIONS 1-9: 2

## 2024-10-25 ASSESSMENT — LIFESTYLE VARIABLES
HOW OFTEN DO YOU HAVE A DRINK CONTAINING ALCOHOL: 1
HOW OFTEN DO YOU HAVE SIX OR MORE DRINKS ON ONE OCCASION: 1
HOW MANY STANDARD DRINKS CONTAINING ALCOHOL DO YOU HAVE ON A TYPICAL DAY: 0
HOW OFTEN DO YOU HAVE A DRINK CONTAINING ALCOHOL: NEVER
HOW MANY STANDARD DRINKS CONTAINING ALCOHOL DO YOU HAVE ON A TYPICAL DAY: PATIENT DOES NOT DRINK

## 2024-10-28 ENCOUNTER — OFFICE VISIT (OUTPATIENT)
Dept: INTERNAL MEDICINE CLINIC | Age: 79
End: 2024-10-28
Payer: MEDICARE

## 2024-10-28 VITALS
TEMPERATURE: 97.4 F | SYSTOLIC BLOOD PRESSURE: 150 MMHG | OXYGEN SATURATION: 100 % | DIASTOLIC BLOOD PRESSURE: 90 MMHG | HEART RATE: 80 BPM | BODY MASS INDEX: 33.82 KG/M2 | HEIGHT: 65 IN | WEIGHT: 203 LBS

## 2024-10-28 DIAGNOSIS — I67.1 BRAIN ANEURYSM: ICD-10-CM

## 2024-10-28 DIAGNOSIS — Z00.00 MEDICARE ANNUAL WELLNESS VISIT, SUBSEQUENT: Primary | ICD-10-CM

## 2024-10-28 DIAGNOSIS — Z12.31 ENCOUNTER FOR SCREENING MAMMOGRAM FOR MALIGNANT NEOPLASM OF BREAST: ICD-10-CM

## 2024-10-28 DIAGNOSIS — I10 ESSENTIAL HYPERTENSION: ICD-10-CM

## 2024-10-28 DIAGNOSIS — H91.93 BILATERAL HEARING LOSS, UNSPECIFIED HEARING LOSS TYPE: ICD-10-CM

## 2024-10-28 PROCEDURE — 3080F DIAST BP >= 90 MM HG: CPT | Performed by: INTERNAL MEDICINE

## 2024-10-28 PROCEDURE — 99214 OFFICE O/P EST MOD 30 MIN: CPT | Performed by: INTERNAL MEDICINE

## 2024-10-28 PROCEDURE — 1123F ACP DISCUSS/DSCN MKR DOCD: CPT | Performed by: INTERNAL MEDICINE

## 2024-10-28 PROCEDURE — 3077F SYST BP >= 140 MM HG: CPT | Performed by: INTERNAL MEDICINE

## 2024-10-28 PROCEDURE — G0439 PPPS, SUBSEQ VISIT: HCPCS | Performed by: INTERNAL MEDICINE

## 2024-10-28 RX ORDER — UBIDECARENONE 75 MG
100 CAPSULE ORAL DAILY
COMMUNITY

## 2024-10-28 RX ORDER — HYDRALAZINE HYDROCHLORIDE 25 MG/1
25 TABLET, FILM COATED ORAL 3 TIMES DAILY
Qty: 60 TABLET | Refills: 1
Start: 2024-10-28

## 2024-10-28 RX ORDER — ACETAMINOPHEN 500 MG
500 TABLET ORAL EVERY 6 HOURS PRN
COMMUNITY

## 2024-10-28 RX ORDER — CETIRIZINE HYDROCHLORIDE 5 MG/1
5 TABLET ORAL PRN
COMMUNITY

## 2024-10-28 RX ORDER — FAMOTIDINE 10 MG
10 TABLET ORAL NIGHTLY PRN
COMMUNITY

## 2024-10-28 NOTE — PROGRESS NOTES
Medicare Annual Wellness Visit    Cecy Nina is here for Medicare AWV (She is concerned about her blood pressure and brought her readings with her. )    Assessment & Plan     Medicare annual wellness visit, subsequent  Assessment & Plan:  Here for annual wellness visit, overall doing well from a clinical standpoint, other for below.  As for health maintenance:  -breast cancer screen: last mammo 2021, due  -colon cancer screen: Dr. Lyons last year and reports not repeating anymore   -osteoporosis screen: last DEXA 2022 normal   -lung cancer screen: not indicated  -BP as below  -HCV completed tx and PCR undetectable   -negative depression and DV screen  -Independent of ADLs and IADLs, declines sig memory change or further w/u at this point though her cognitive screen is abnormal today   -Advised to eat a healthy, well-balanced diet  -Advised to exercise at least 30min/day 5x/week for heart and bone health  -Check skin regularly for new moles or changes in moles. wear sunscreen at least SPF 30 and don't forget to reapply every 3-4 hours or if you are in the water  -Follow up with dentist at least twice/year.  -Follow up with eye doctor at least once/year.  -Calcium goal is 1200 mg a day ideally from diet (dairy, green leafy vegetables, nuts) , at least 800 units of vitamin D  -Discussed importance of living will, advised to discuss with family and fill out form. Information/packet provided today     Essential hypertension  Assessment & Plan:  Uncontrolled   -continue metoprolol 50 mg daily, benicar 40 mg daily, chlorthalidone 25mg, amlodipine 10 mg , hydralazine 25 bid. Will increase dose to hydralazine TID   -hx of nephrectomy and CKD 3b  -Already scheduled to see Dr. clifford in the next week, keep BP log and bring to visit with him for further adjustment  Orders:  -     hydrALAZINE (APRESOLINE) 25 MG tablet; Take 1 tablet by mouth 3 times daily, Disp-60 tablet, R-1Adjust Sig  -     Comprehensive Metabolic

## 2024-10-28 NOTE — PATIENT INSTRUCTIONS

## 2024-10-28 NOTE — ASSESSMENT & PLAN NOTE
Uncontrolled   -continue metoprolol 50 mg daily, benicar 40 mg daily, chlorthalidone 25mg, amlodipine 10 mg , hydralazine 25 bid. Will increase dose to hydralazine TID   -hx of nephrectomy and CKD 3b  -Already scheduled to see Dr. clifford in the next week, keep BP log and bring to visit with him for further adjustment

## 2024-10-28 NOTE — ASSESSMENT & PLAN NOTE
-sees Dr Cote with [facility], 7 mm carotid artery aneurysm, for clinical surveillance (surgery risk seem to be higher than bleeding risk at this point  -plan for MRA q 6 month for 24 months. If the radiographic appearance of the aneurysm remains stable throughout the first 24 month duration of surveillance -> plan to prolong surveillance interval to 12 months for months 24 through 60 to complete a 5-year course of surveillance. Should the aneurysm demonstrate interim enlargement over the course of successive surveillance imaging-> will likely recommend further evaluation through dedicated, catheter-based, diagnostic cerebral angiography.   -needs relatives screening (CTA head or MRA non-con brain).

## 2024-10-28 NOTE — ASSESSMENT & PLAN NOTE
Here for annual wellness visit, overall doing well from a clinical standpoint, other for below.  As for health maintenance:  -breast cancer screen: last mammo 2021, due  -colon cancer screen: Dr. Lyons last year and reports not repeating anymore   -osteoporosis screen: last DEXA 2022 normal   -lung cancer screen: not indicated  -BP as below  -HCV completed tx and PCR undetectable   -negative depression and DV screen  -Independent of ADLs and IADLs, declines sig memory change or further w/u at this point though her cognitive screen is abnormal today   -Advised to eat a healthy, well-balanced diet  -Advised to exercise at least 30min/day 5x/week for heart and bone health  -Check skin regularly for new moles or changes in moles. wear sunscreen at least SPF 30 and don't forget to reapply every 3-4 hours or if you are in the water  -Follow up with dentist at least twice/year.  -Follow up with eye doctor at least once/year.  -Calcium goal is 1200 mg a day ideally from diet (dairy, green leafy vegetables, nuts) , at least 800 units of vitamin D  -Discussed importance of living will, advised to discuss with family and fill out form. Information/packet provided today

## 2024-10-31 ENCOUNTER — HOSPITAL ENCOUNTER (OUTPATIENT)
Dept: MAMMOGRAPHY | Age: 79
Discharge: HOME | End: 2024-10-31
Attending: INTERNAL MEDICINE
Payer: MEDICARE

## 2024-10-31 VITALS — WEIGHT: 200 LBS | HEIGHT: 66 IN | BODY MASS INDEX: 32.14 KG/M2

## 2024-10-31 DIAGNOSIS — Z12.31 ENCOUNTER FOR SCREENING MAMMOGRAM FOR MALIGNANT NEOPLASM OF BREAST: ICD-10-CM

## 2024-10-31 PROCEDURE — 77063 BREAST TOMOSYNTHESIS BI: CPT

## 2024-11-07 ENCOUNTER — CLINICAL DOCUMENTATION (OUTPATIENT)
Dept: INTERNAL MEDICINE CLINIC | Age: 79
End: 2024-11-07

## 2024-11-27 PROBLEM — Z00.00 MEDICARE ANNUAL WELLNESS VISIT, SUBSEQUENT: Status: RESOLVED | Noted: 2024-10-28 | Resolved: 2024-11-27

## 2024-12-30 ENCOUNTER — PATIENT MESSAGE (OUTPATIENT)
Dept: INTERNAL MEDICINE CLINIC | Age: 79
End: 2024-12-30

## 2024-12-30 DIAGNOSIS — N39.0 URINARY TRACT INFECTION WITHOUT HEMATURIA, SITE UNSPECIFIED: Primary | ICD-10-CM

## 2024-12-30 NOTE — TELEPHONE ENCOUNTER
Can call in Cipro 500 mg twice daily for 5 days (or send me to [facility]), but if has fever might suggest complicated infection left pyelonephritis which might need to be treated in the hospital.  If any flank pain, high fevers, nausea or not being able to tolerate p.o., confusion, should be evaluated immediately

## 2024-12-31 RX ORDER — CIPROFLOXACIN 500 MG/1
500 TABLET, FILM COATED ORAL 2 TIMES DAILY
Qty: 10 TABLET | Refills: 0 | OUTPATIENT
Start: 2024-12-31 | End: 2025-01-05

## 2025-01-14 ENCOUNTER — PATIENT MESSAGE (OUTPATIENT)
Dept: INTERNAL MEDICINE CLINIC | Age: 80
End: 2025-01-14

## 2025-01-14 NOTE — TELEPHONE ENCOUNTER
I am not sure which prescriptions she is referring to?  If these are prescriptions that are usually prescribed by me can send me to refill, if prescribed by a different provider should be refilled by them.  I reviewed MRI, appears overall stable size but with some higher risk features to discuss with neurosurgeon

## 2025-01-22 DIAGNOSIS — F41.9 ANXIETY: Primary | ICD-10-CM

## 2025-01-22 RX ORDER — ESCITALOPRAM OXALATE 5 MG/1
5 TABLET ORAL DAILY
Qty: 90 TABLET | Refills: 1 | Status: SHIPPED | OUTPATIENT
Start: 2025-01-22

## 2025-01-24 DIAGNOSIS — I10 ESSENTIAL HYPERTENSION: ICD-10-CM

## 2025-01-25 LAB
ALBUMIN SERPL-MCNC: 4.9 G/DL (ref 3.4–5)
ALBUMIN/GLOB SERPL: 1.8 {RATIO} (ref 1.1–2.2)
ALP SERPL-CCNC: 92 U/L (ref 40–129)
ALT SERPL-CCNC: 18 U/L (ref 10–40)
ANION GAP SERPL CALCULATED.3IONS-SCNC: 12 MMOL/L (ref 3–16)
AST SERPL-CCNC: 27 U/L (ref 15–37)
BASOPHILS # BLD: 0 K/UL (ref 0–0.2)
BASOPHILS NFR BLD: 1.3 %
BILIRUB SERPL-MCNC: 0.5 MG/DL (ref 0–1)
BUN SERPL-MCNC: 38 MG/DL (ref 7–20)
CALCIUM SERPL-MCNC: 10.2 MG/DL (ref 8.3–10.6)
CHLORIDE SERPL-SCNC: 103 MMOL/L (ref 99–110)
CO2 SERPL-SCNC: 23 MMOL/L (ref 21–32)
CREAT SERPL-MCNC: 1.8 MG/DL (ref 0.6–1.2)
DEPRECATED RDW RBC AUTO: 13.7 % (ref 12.4–15.4)
EOSINOPHIL # BLD: 0.2 K/UL (ref 0–0.6)
EOSINOPHIL NFR BLD: 7.4 %
GFR SERPLBLD CREATININE-BSD FMLA CKD-EPI: 29 ML/MIN/{1.73_M2}
GLUCOSE SERPL-MCNC: 104 MG/DL (ref 70–99)
HCT VFR BLD AUTO: 40 % (ref 36–48)
HGB BLD-MCNC: 13.5 G/DL (ref 12–16)
LYMPHOCYTES # BLD: 1.2 K/UL (ref 1–5.1)
LYMPHOCYTES NFR BLD: 38.3 %
MCH RBC QN AUTO: 32.7 PG (ref 26–34)
MCHC RBC AUTO-ENTMCNC: 33.7 G/DL (ref 31–36)
MCV RBC AUTO: 96.9 FL (ref 80–100)
MONOCYTES # BLD: 0.4 K/UL (ref 0–1.3)
MONOCYTES NFR BLD: 13.5 %
NEUTROPHILS # BLD: 1.3 K/UL (ref 1.7–7.7)
NEUTROPHILS NFR BLD: 39.5 %
PLATELET # BLD AUTO: 194 K/UL (ref 135–450)
PMV BLD AUTO: 11 FL (ref 5–10.5)
POTASSIUM SERPL-SCNC: 5 MMOL/L (ref 3.5–5.1)
PROT SERPL-MCNC: 7.6 G/DL (ref 6.4–8.2)
RBC # BLD AUTO: 4.13 M/UL (ref 4–5.2)
SODIUM SERPL-SCNC: 138 MMOL/L (ref 136–145)
WBC # BLD AUTO: 3.2 K/UL (ref 4–11)

## 2025-02-07 DIAGNOSIS — D72.9 ABNORMAL WBC COUNT: ICD-10-CM

## 2025-02-07 DIAGNOSIS — N18.30 STAGE 3 CHRONIC KIDNEY DISEASE, UNSPECIFIED WHETHER STAGE 3A OR 3B CKD (HCC): Primary | ICD-10-CM

## 2025-03-13 ENCOUNTER — PROCEDURE VISIT (OUTPATIENT)
Dept: AUDIOLOGY | Age: 80
End: 2025-03-13
Payer: MEDICARE

## 2025-03-13 DIAGNOSIS — H90.3 SENSORINEURAL HEARING LOSS (SNHL) OF BOTH EARS: Primary | ICD-10-CM

## 2025-03-13 DIAGNOSIS — H93.13 TINNITUS OF BOTH EARS: ICD-10-CM

## 2025-03-13 PROCEDURE — 92567 TYMPANOMETRY: CPT | Performed by: AUDIOLOGIST

## 2025-03-13 PROCEDURE — 92557 COMPREHENSIVE HEARING TEST: CPT | Performed by: AUDIOLOGIST

## 2025-03-13 NOTE — PROGRESS NOTES
Cecy Nina   1945, 79 y.o. female   [id number]       Referring Provider: Tesfaye Sarmiento MD   Referral Type: In an order in Epic    Reason for Visit: Evaluation of suspected change in hearing, tinnitus, or balance.    ADULT AUDIOLOGIC EVALUATION      Cecy Nina is a 79 y.o. female seen today, 3/13/2025, for an initial audiologic evaluation.      AUDIOLOGIC AND OTHER PERTINENT MEDICAL HISTORY:        Ceyc Nina noted concern for tinnitus bilaterally, buzzing sound, comes and goes, seems noticeable in past few months and more noticeable after being diagnosed with a brain aneurysm; some gradual decrease in hearing, feels she hears well in general; some dizziness, she relates to medications; some noise exposure from concerts.     She denied otalgia, aural fullness, otorrhea, history of ear surgery, and family history of hearing loss.    IMPRESSIONS:        Today's results revealed bilateral sensorineural hearing loss with normal middle ear function and excellent word recognition for conversational speech for both ears. Reviewed findings with patient and discussed tinnitus management strategies and considerations for amplification.    Follow medical recommendations of Tesfaye Sarmiento MD.     ASSESSMENT AND FINDINGS:       Otoscopy revealed: Clear ear canals bilaterally    RIGHT EAR:  Hearing Sensitivity: Within normal limits to mild through 4000 Hz steeply sloping to moderately-severe sensorineural hearing loss.  Speech Recognition Threshold: 25 dBHL  Word Recognition: Excellent (96%), based on NU-6 25-word list at 55 dBHL using recorded speech stimuli.    Tympanometry: Normal peak pressure and compliance, Type A tympanogram, consistent with normal middle ear function.       LEFT EAR:  Hearing Sensitivity: Within normal limits to mild through 4000 Hz steeply sloping to moderately-severe sensorineural hearing loss.  Speech Recognition Threshold: 20 dBHL  Word Recognition: Excellent (100%), based on NU-6

## 2025-03-13 NOTE — PATIENT INSTRUCTIONS
into a person’s ear      Some additional items that may be helpful:   - Remain patient - this is important for both parties   - Write down items that still cannot be heard/understood.  You may write with pen/paper or consider typing/texting on a cell phone or smart device.   - If background noise is unavoidable, try to keep yourself in a good position in the room.  By sitting at a batista on the side of the restaurant (preferably a corner), it will be easier to communicate than if you were sitting at a table in the middle with background noise surrounding you.  Keep yourself positioned away from music speakers or heavy foot traffic.   - If you have difficulty with the television, consider these options:      -- Use closed-captioning, which is a setting you can turn on that displays the spoken words in a written form on the screen.  There may be a slight delay, but this can help fill in missing information.  This can be especially helpful when watching programs with accented speech.      -- Consider use of a sound bar or speakers that come from the front of the TV.  With modern flat screen TVs, many of them have speakers that come out of the back of the device, which makes sound bounce off the wall behind it, then go into the room.  Sound bars can allow the sound to go straight in your direction and can improve sound quality.      -- Consider ear level devices to help improve the volume and/or sound quality of the program.  There are devices that work like headphones that you can adjust the volume for your ears while others can have the volume at a more comfortable level, such as \"TV Ears\".  Most hearing aids have devices that allow them to connect directly to the TV and improve sound quality.     Tinnitus: Overview and Management Strategies          Many people have some ringing sounds in their ears once in a while. You may hear a roar, a hiss, a tinkle, or a buzz. The sound usually lasts only a few minutes. If it

## 2025-03-27 NOTE — PROGRESS NOTES
Attempted to call patient about procedure. No answer. Voicemail left. Told to be here at 0630 for procedure at 0800. NPO after midnight, but can take morning medication with sips of water. Office should have told them when and if they should stop any blood thinners. Told to have a responsible adult be with the patient to take them home and stay with them afterwards, if they are not admitted to hospital afterwards. And if available bring current list of medications.    100.2

## 2025-03-28 ENCOUNTER — HOSPITAL ENCOUNTER (OUTPATIENT)
Age: 80
Setting detail: OUTPATIENT SURGERY
Discharge: HOME | End: 2025-03-28
Attending: STUDENT IN AN ORGANIZED HEALTH CARE EDUCATION/TRAINING PROGRAM | Admitting: STUDENT IN AN ORGANIZED HEALTH CARE EDUCATION/TRAINING PROGRAM
Payer: MEDICARE

## 2025-03-28 VITALS
BODY MASS INDEX: 30.53 KG/M2 | DIASTOLIC BLOOD PRESSURE: 76 MMHG | TEMPERATURE: 98.2 F | RESPIRATION RATE: 16 BRPM | HEIGHT: 66 IN | SYSTOLIC BLOOD PRESSURE: 142 MMHG | HEART RATE: 51 BPM | OXYGEN SATURATION: 99 % | WEIGHT: 190 LBS

## 2025-03-28 DIAGNOSIS — I67.1 CEREBRAL ANEURYSM, NONRUPTURED: ICD-10-CM

## 2025-03-28 LAB
ANION GAP SERPL CALCULATED.3IONS-SCNC: 10 MMOL/L (ref 3–16)
BUN SERPL-MCNC: 16 MG/DL (ref 7–20)
CALCIUM SERPL-MCNC: 10.1 MG/DL (ref 8.3–10.6)
CHLORIDE SERPL-SCNC: 106 MMOL/L (ref 99–110)
CO2 SERPL-SCNC: 27 MMOL/L (ref 21–32)
CREAT SERPL-MCNC: 1.1 MG/DL (ref 0.6–1.2)
DEPRECATED RDW RBC AUTO: 13.7 % (ref 12.4–15.4)
ECHO BSA: 2 M2
GFR SERPLBLD CREATININE-BSD FMLA CKD-EPI: 52 ML/MIN/{1.73_M2}
GLUCOSE SERPL-MCNC: 111 MG/DL (ref 70–99)
HCT VFR BLD AUTO: 39.5 % (ref 36–48)
HGB BLD-MCNC: 13.2 G/DL (ref 12–16)
INR PPP: 0.95 (ref 0.85–1.15)
MCH RBC QN AUTO: 32.2 PG (ref 26–34)
MCHC RBC AUTO-ENTMCNC: 33.4 G/DL (ref 31–36)
MCV RBC AUTO: 96.5 FL (ref 80–100)
PLATELET # BLD AUTO: 176 K/UL (ref 135–450)
PMV BLD AUTO: 9 FL (ref 5–10.5)
POTASSIUM SERPL-SCNC: 4 MMOL/L (ref 3.5–5.1)
PROTHROMBIN TIME: 12.9 SEC (ref 11.9–14.9)
RBC # BLD AUTO: 4.09 M/UL (ref 4–5.2)
SODIUM SERPL-SCNC: 143 MMOL/L (ref 136–145)
WBC # BLD AUTO: 3.8 K/UL (ref 4–11)

## 2025-03-28 PROCEDURE — C1894 INTRO/SHEATH, NON-LASER: HCPCS | Performed by: STUDENT IN AN ORGANIZED HEALTH CARE EDUCATION/TRAINING PROGRAM

## 2025-03-28 PROCEDURE — C1887 CATHETER, GUIDING: HCPCS | Performed by: STUDENT IN AN ORGANIZED HEALTH CARE EDUCATION/TRAINING PROGRAM

## 2025-03-28 PROCEDURE — 2709999900 HC NON-CHARGEABLE SUPPLY: Performed by: STUDENT IN AN ORGANIZED HEALTH CARE EDUCATION/TRAINING PROGRAM

## 2025-03-28 PROCEDURE — C1769 GUIDE WIRE: HCPCS | Performed by: STUDENT IN AN ORGANIZED HEALTH CARE EDUCATION/TRAINING PROGRAM

## 2025-03-28 PROCEDURE — 36226 PLACE CATH VERTEBRAL ART: CPT | Performed by: STUDENT IN AN ORGANIZED HEALTH CARE EDUCATION/TRAINING PROGRAM

## 2025-03-28 PROCEDURE — 99152 MOD SED SAME PHYS/QHP 5/>YRS: CPT | Performed by: STUDENT IN AN ORGANIZED HEALTH CARE EDUCATION/TRAINING PROGRAM

## 2025-03-28 PROCEDURE — 6360000004 HC RX CONTRAST MEDICATION: Performed by: STUDENT IN AN ORGANIZED HEALTH CARE EDUCATION/TRAINING PROGRAM

## 2025-03-28 PROCEDURE — 85610 PROTHROMBIN TIME: CPT

## 2025-03-28 PROCEDURE — 7100000011 HC PHASE II RECOVERY - ADDTL 15 MIN: Performed by: STUDENT IN AN ORGANIZED HEALTH CARE EDUCATION/TRAINING PROGRAM

## 2025-03-28 PROCEDURE — 85027 COMPLETE CBC AUTOMATED: CPT

## 2025-03-28 PROCEDURE — 6360000002 HC RX W HCPCS: Performed by: STUDENT IN AN ORGANIZED HEALTH CARE EDUCATION/TRAINING PROGRAM

## 2025-03-28 PROCEDURE — 7100000010 HC PHASE II RECOVERY - FIRST 15 MIN: Performed by: STUDENT IN AN ORGANIZED HEALTH CARE EDUCATION/TRAINING PROGRAM

## 2025-03-28 PROCEDURE — 2500000003 HC RX 250 WO HCPCS: Performed by: STUDENT IN AN ORGANIZED HEALTH CARE EDUCATION/TRAINING PROGRAM

## 2025-03-28 PROCEDURE — 99153 MOD SED SAME PHYS/QHP EA: CPT | Performed by: STUDENT IN AN ORGANIZED HEALTH CARE EDUCATION/TRAINING PROGRAM

## 2025-03-28 PROCEDURE — 36225 PLACE CATH SUBCLAVIAN ART: CPT | Performed by: STUDENT IN AN ORGANIZED HEALTH CARE EDUCATION/TRAINING PROGRAM

## 2025-03-28 PROCEDURE — 76377 3D RENDER W/INTRP POSTPROCES: CPT | Performed by: STUDENT IN AN ORGANIZED HEALTH CARE EDUCATION/TRAINING PROGRAM

## 2025-03-28 PROCEDURE — 80048 BASIC METABOLIC PNL TOTAL CA: CPT

## 2025-03-28 PROCEDURE — 36223 PLACE CATH CAROTID/INOM ART: CPT | Performed by: STUDENT IN AN ORGANIZED HEALTH CARE EDUCATION/TRAINING PROGRAM

## 2025-03-28 RX ORDER — SODIUM CHLORIDE 9 MG/ML
INJECTION, SOLUTION INTRAVENOUS CONTINUOUS
Status: DISCONTINUED | OUTPATIENT
Start: 2025-03-28 | End: 2025-03-28 | Stop reason: HOSPADM

## 2025-03-28 RX ORDER — HEPARIN SODIUM 1000 [USP'U]/ML
INJECTION, SOLUTION INTRAVENOUS; SUBCUTANEOUS PRN
Status: DISCONTINUED | OUTPATIENT
Start: 2025-03-28 | End: 2025-03-28 | Stop reason: HOSPADM

## 2025-03-28 RX ORDER — IOPAMIDOL 612 MG/ML
INJECTION, SOLUTION INTRAVASCULAR PRN
Status: DISCONTINUED | OUTPATIENT
Start: 2025-03-28 | End: 2025-03-28 | Stop reason: HOSPADM

## 2025-03-28 RX ORDER — HYDRALAZINE HYDROCHLORIDE 20 MG/ML
10 INJECTION INTRAMUSCULAR; INTRAVENOUS EVERY 10 MIN PRN
Status: DISCONTINUED | OUTPATIENT
Start: 2025-03-28 | End: 2025-03-28 | Stop reason: SDUPTHER

## 2025-03-28 RX ORDER — BISACODYL 5 MG/1
5 TABLET, DELAYED RELEASE ORAL DAILY PRN
COMMUNITY

## 2025-03-28 RX ORDER — ONDANSETRON 4 MG/1
4 TABLET, ORALLY DISINTEGRATING ORAL EVERY 8 HOURS PRN
Status: DISCONTINUED | OUTPATIENT
Start: 2025-03-28 | End: 2025-03-28 | Stop reason: HOSPADM

## 2025-03-28 RX ORDER — ACETAMINOPHEN 325 MG/1
650 TABLET ORAL EVERY 4 HOURS PRN
Status: DISCONTINUED | OUTPATIENT
Start: 2025-03-28 | End: 2025-03-28 | Stop reason: SDUPTHER

## 2025-03-28 RX ORDER — MIDAZOLAM 1 MG/ML
INJECTION INTRAMUSCULAR; INTRAVENOUS PRN
Status: DISCONTINUED | OUTPATIENT
Start: 2025-03-28 | End: 2025-03-28 | Stop reason: HOSPADM

## 2025-03-28 RX ORDER — ONDANSETRON 2 MG/ML
4 INJECTION INTRAMUSCULAR; INTRAVENOUS EVERY 6 HOURS PRN
Status: DISCONTINUED | OUTPATIENT
Start: 2025-03-28 | End: 2025-03-28 | Stop reason: SDUPTHER

## 2025-03-28 RX ORDER — ONDANSETRON 4 MG/1
4 TABLET, ORALLY DISINTEGRATING ORAL EVERY 8 HOURS PRN
Status: DISCONTINUED | OUTPATIENT
Start: 2025-03-28 | End: 2025-03-28 | Stop reason: SDUPTHER

## 2025-03-28 RX ORDER — ACETAMINOPHEN 325 MG/1
650 TABLET ORAL EVERY 4 HOURS PRN
Status: DISCONTINUED | OUTPATIENT
Start: 2025-03-28 | End: 2025-03-28 | Stop reason: HOSPADM

## 2025-03-28 RX ORDER — HYDRALAZINE HYDROCHLORIDE 20 MG/ML
10 INJECTION INTRAMUSCULAR; INTRAVENOUS EVERY 10 MIN PRN
Status: DISCONTINUED | OUTPATIENT
Start: 2025-03-28 | End: 2025-03-28 | Stop reason: HOSPADM

## 2025-03-28 RX ORDER — ONDANSETRON 2 MG/ML
4 INJECTION INTRAMUSCULAR; INTRAVENOUS EVERY 6 HOURS PRN
Status: DISCONTINUED | OUTPATIENT
Start: 2025-03-28 | End: 2025-03-28 | Stop reason: HOSPADM

## 2025-03-28 NOTE — PROGRESS NOTES
Patient/family given discharge instructions. Patient/family verbalize understanding of discharge instructions, all questions addressed, copy given to patient/family. Pt transferred to vehicle via wheelchair to be discharged home with family.  IV removed. All air and TR band removed with no complications per the policy of removal times.

## 2025-03-28 NOTE — PROCEDURES
Patient name: Cecy Nina     Medical record number: [id number]      YOB: 1945    PROCEDURE:  -Diagnostic Cerebral Angiogram  -Three dimensional angiography was performed during injection of the right internal carotid artery and image reconstruction was done at an independent workstation.    Preprocedure diagnosis: right ICA aneurysm    Postprocedure diagnosis:   1. There are two aneurysms of the right internal carotid artery. One is a right ICA terminus aneurysm, measuring 6 mm (W) x 5 (H) x 4 (N). There is a second aneurysm along the dorsal wall of the communicating ICA segment, measuring 2 mm.    PROCEDURE DATE: 03/28/25    HISTORY:  Cecy Nina is a 79 y.o. 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 She comes for diagnostic angiography for further assessment and to rule out other aneurysms.     VESSELS CATHETERIZED:  Left common carotid artery  Right common carotid artery  Left subclavian artery  Right subclavian artery  Right vertebral artery  Right radial artery      CONSENT: Prior to the procedure, the technical aspects of the procedure as well as potential risks and benefits were explained to the patient. Specifically the risks of cerebral infarction, puncture site hemorrhage, femoral nerve injury, retroperitoneal hemorrhage, hemorrhagic shock, infection, device failure, anaphylaxis, renal failure, limb loss, death, radiation effects (hair loss, cataracts, radiation burns, tumors, dementia), arterial dissection, arterial pseudoaneurysm, and arteriovenous fistula were discussed. The advantages and disadvantages of alternative procedures were presented. An opportunity to state any questions or concerns was given, and these were then addressed. Following this process, it was requested that we proceed with the proposed procedure and this was expressed in the form  performed at each catheter station. 3D angiography was done from the right CCA. The internal carotids were not selected due to vessel tortuosity. After reviewing the images the catheter was removed. The sheath was removed and the hemostasis device was applied.    EBL: minimal    SPECIMEN: none      FINDINGS:    RIGHT COMMON CAROTID ARTERY, CERVICAL VIEWS: The right common carotid origin is normal. The right common carotid artery bifurcation is normal with no evidence of stenosis or atherosclerosis.  The cervical segment of the right internal carotid artery has normal caliber and course. The proximal branches of the external carotid artery are normal.    RIGHT INTERNAL CAROTID ARTERY, INTRACRANIAL VIEWS: The intracranial right internal carotid artery has normal caliber over its entire course. The ophthalmic artery, posterior communicating artery, and anterior choroidal artery are visualized. The internal carotid artery terminates into the right middle and anterior cerebral arteries. The right middle and anterior cerebral arteries have normal distribution to the cortical branches and the capillary and venous phases are normal. 1. There are two aneurysms of the right internal carotid artery. One is a right ICA terminus aneurysm, measuring 6 mm (W) x 5 (H) x 4 (N). There is a second aneurysm along the dorsal wall of the communicating ICA segment, measuring 2 mm in all dimensions    The visualized portions of the right internal maxillary, middle meningeal, superficial temporal, and occipital arteries are all of normal caliber and distribution. No early venous filling is seen and there are no vascular malformations or lesions.      LEFT COMMON CAROTID ARTERY, CERVICAL VIEWS: The left common carotid origin is normal. The left common carotid artery bifurcation is normal with no evidence of stenosis or atherosclerosis.  The cervical segment of the left internal carotid artery has normal caliber and course. The proximal

## 2025-03-28 NOTE — DISCHARGE INSTRUCTIONS
[facility]  Cardiovascular Special Procedures   Radial Access Cerebral Angiogram  Patient Discharge Instructions      Day 1 (Procedure Day):  Rest for the remainder of the day.  Avoid excessive use of the affected arm.  Do not drive a car.  Do not be alone.  Leave dressing intact.    Day 2:  You may drive a car, unless otherwise directed by physician.  Remove dressing.  You may bathe/shower, but wash gently around the puncture site and pat dry.  Place new band-aid on site daily until skin heals.      Things to Avoid:  You may not do any strenuous exercises for one week. (ex: golfing, bowling, tennis, vacuum, snow removal, jogging, and aerobic exercises).  No hot tubs, baths, or pools for 3-5 days.  Do not lift anything over 3-5 pounds for 3 days, with affected arm.  No lotions, powders, or ointments near site for 5 days.    Things to watch for:  You may have a small lump or a bruise.  This is common and will go away.  If bleeding occurs from the site, or a hematoma (lump) begins to increase in size, immediately apply pressure directly over the site and call 911 to return to the hospital.  Report any coolness or numbness in the arm or hand immediately.  Report any excessive pain of the arm or hand immediately.  If mild discomfort occurs at the puncture site you may place an ice pack on the site at 15 minute intervals..   Watch for signs and symptoms of an infection at the arm site (fever, local pain, redness, warmth or discharge from the puncture site), call your physician immediately if any develop.    ).      Any Questions please call us at [phone] (between 7am- 5pm, Mon-Fri).  After hours you should contact your physician.          [facility]  Cardiovascular Special Procedures  General Discharge Instructions    PROCEDURE: ____________________________________________________    __x__ You may be drowsy or lightheaded after receiving sedation. DO NOT operate a vehicle (automobile, bicycle,

## 2025-03-28 NOTE — H&P
No changes to my recent H&P from clinic, pasted below:      She comes to clinic after a recent MRA vessel wall study revealed enhancement of the ICA along her aneurysm, concerning for aneurysm instability. She is joined by multiple family members. She has no new complaints or concerns.           From previous visit 10/21/24:      This is a 79 y F with new daignosis of brain aneurysm. She was having abnormal head pressure sensations recently, possibly secondary to new HTN medication, which prompted a brain CTA at [facility]. This revealed a 7mm right iCA terminus aneurysm.          She has HTN. She has kidney disease (and apparently a prior nephrectomy, she is not sure exactly why). She lives with her spouse independently. Family history is notable for a ruptured brain aneurysm in her daughter around 20 years of age, No other known family aneurysms.              Review of Histories and Systems               Physical Exam       The patient is well-appearing, pleasant, and in no acute distress. The patient is alert and oriented ×4, appropriate, conversant, with normal mood and affect. Pupils are equal, round, and reactive to light. Extraocular muscles are intact. Visual fields are grossly full. Facial expression and sensation are symmetric. Strength is 5 out of 5 in all 4 extremities without pronator drift. The patient walks with a well-balanced and well coordinated gait. Sensation is intact to light touch in all 4 extremities.                   Assessment       Impression: This is a 79y F with incidental brain aneurysm - ICA terminus 7 mm aneurysm. Initially, I discussed with her the risks of hemorrhage from this is low. However, we recently obtained a brain MRA vessel wall study, which revealed apparent instability of the aneurysm wall. She patient has family history of ruptured aneurysm (in daughter).          Due to the relative new nature of this vessel wall imaging, we do not have good data on how much this increases

## 2025-04-02 ENCOUNTER — PATIENT MESSAGE (OUTPATIENT)
Dept: INTERNAL MEDICINE CLINIC | Age: 80
End: 2025-04-02

## 2025-04-03 NOTE — TELEPHONE ENCOUNTER
She can increase dose of her lexapro from 5 to 10 mg daily if she is not having adequate coverage with current dose. However, please make her aware that this medication takes time to work, 4-6 weeks for full effect even. If this is a transient stressor and her baseline stress levels have not increased, it may not be appropriate. She can try seeing a therapist or deep breathing exercises, meditation, exercise as well. I would recommend discussing this with Dr. Sarmiento or if it feels urgent, she can make an appointment sooner with a different provider.

## 2025-04-25 DIAGNOSIS — D72.9 ABNORMAL WBC COUNT: ICD-10-CM

## 2025-04-25 DIAGNOSIS — N18.30 STAGE 3 CHRONIC KIDNEY DISEASE, UNSPECIFIED WHETHER STAGE 3A OR 3B CKD (HCC): ICD-10-CM

## 2025-04-25 LAB
ANION GAP SERPL CALCULATED.3IONS-SCNC: 11 MMOL/L (ref 3–16)
BASOPHILS # BLD: 0 K/UL (ref 0–0.2)
BASOPHILS NFR BLD: 1 %
BUN SERPL-MCNC: 24 MG/DL (ref 7–20)
CALCIUM SERPL-MCNC: 10.4 MG/DL (ref 8.3–10.6)
CHLORIDE SERPL-SCNC: 106 MMOL/L (ref 99–110)
CO2 SERPL-SCNC: 26 MMOL/L (ref 21–32)
CREAT SERPL-MCNC: 1.1 MG/DL (ref 0.6–1.2)
DEPRECATED RDW RBC AUTO: 14.2 % (ref 12.4–15.4)
EOSINOPHIL # BLD: 0.4 K/UL (ref 0–0.6)
EOSINOPHIL NFR BLD: 7.7 %
GFR SERPLBLD CREATININE-BSD FMLA CKD-EPI: 52 ML/MIN/{1.73_M2}
GLUCOSE SERPL-MCNC: 101 MG/DL (ref 70–99)
HCT VFR BLD AUTO: 36.3 % (ref 36–48)
HGB BLD-MCNC: 12.5 G/DL (ref 12–16)
LYMPHOCYTES # BLD: 1.6 K/UL (ref 1–5.1)
LYMPHOCYTES NFR BLD: 30.9 %
MCH RBC QN AUTO: 32.7 PG (ref 26–34)
MCHC RBC AUTO-ENTMCNC: 34.5 G/DL (ref 31–36)
MCV RBC AUTO: 95 FL (ref 80–100)
MONOCYTES # BLD: 0.4 K/UL (ref 0–1.3)
MONOCYTES NFR BLD: 7.5 %
NEUTROPHILS # BLD: 2.7 K/UL (ref 1.7–7.7)
NEUTROPHILS NFR BLD: 52.9 %
PLATELET # BLD AUTO: 167 K/UL (ref 135–450)
PMV BLD AUTO: 10.4 FL (ref 5–10.5)
POTASSIUM SERPL-SCNC: 4.5 MMOL/L (ref 3.5–5.1)
RBC # BLD AUTO: 3.82 M/UL (ref 4–5.2)
SODIUM SERPL-SCNC: 143 MMOL/L (ref 136–145)
WBC # BLD AUTO: 5 K/UL (ref 4–11)

## 2025-04-26 SDOH — ECONOMIC STABILITY: FOOD INSECURITY: WITHIN THE PAST 12 MONTHS, THE FOOD YOU BOUGHT JUST DIDN'T LAST AND YOU DIDN'T HAVE MONEY TO GET MORE.: NEVER TRUE

## 2025-04-26 SDOH — ECONOMIC STABILITY: TRANSPORTATION INSECURITY
IN THE PAST 12 MONTHS, HAS THE LACK OF TRANSPORTATION KEPT YOU FROM MEDICAL APPOINTMENTS OR FROM GETTING MEDICATIONS?: NO

## 2025-04-26 SDOH — ECONOMIC STABILITY: INCOME INSECURITY: IN THE LAST 12 MONTHS, WAS THERE A TIME WHEN YOU WERE NOT ABLE TO PAY THE MORTGAGE OR RENT ON TIME?: NO

## 2025-04-26 SDOH — ECONOMIC STABILITY: FOOD INSECURITY: WITHIN THE PAST 12 MONTHS, YOU WORRIED THAT YOUR FOOD WOULD RUN OUT BEFORE YOU GOT MONEY TO BUY MORE.: NEVER TRUE

## 2025-04-26 ASSESSMENT — PATIENT HEALTH QUESTIONNAIRE - PHQ9
SUM OF ALL RESPONSES TO PHQ QUESTIONS 1-9: 1
SUM OF ALL RESPONSES TO PHQ9 QUESTIONS 1 & 2: 1
2. FEELING DOWN, DEPRESSED OR HOPELESS: SEVERAL DAYS
SUM OF ALL RESPONSES TO PHQ QUESTIONS 1-9: 1
2. FEELING DOWN, DEPRESSED OR HOPELESS: SEVERAL DAYS
1. LITTLE INTEREST OR PLEASURE IN DOING THINGS: NOT AT ALL
SUM OF ALL RESPONSES TO PHQ QUESTIONS 1-9: 1
SUM OF ALL RESPONSES TO PHQ QUESTIONS 1-9: 1
1. LITTLE INTEREST OR PLEASURE IN DOING THINGS: NOT AT ALL

## 2025-04-29 ENCOUNTER — OFFICE VISIT (OUTPATIENT)
Dept: INTERNAL MEDICINE CLINIC | Age: 80
End: 2025-04-29
Payer: MEDICARE

## 2025-04-29 VITALS
OXYGEN SATURATION: 97 % | HEART RATE: 69 BPM | DIASTOLIC BLOOD PRESSURE: 80 MMHG | BODY MASS INDEX: 31.73 KG/M2 | SYSTOLIC BLOOD PRESSURE: 136 MMHG | WEIGHT: 196.6 LBS | TEMPERATURE: 98.2 F

## 2025-04-29 DIAGNOSIS — I10 ESSENTIAL HYPERTENSION: ICD-10-CM

## 2025-04-29 DIAGNOSIS — J45.901 ASTHMA WITH ACUTE EXACERBATION, UNSPECIFIED ASTHMA SEVERITY, UNSPECIFIED WHETHER PERSISTENT: ICD-10-CM

## 2025-04-29 DIAGNOSIS — F41.9 ANXIETY: ICD-10-CM

## 2025-04-29 DIAGNOSIS — I67.1 BRAIN ANEURYSM: Primary | ICD-10-CM

## 2025-04-29 PROBLEM — I50.9 CONGESTIVE HEART FAILURE, UNSPECIFIED HF CHRONICITY, UNSPECIFIED HEART FAILURE TYPE (HCC): Status: ACTIVE | Noted: 2025-04-29

## 2025-04-29 PROBLEM — I50.9 CONGESTIVE HEART FAILURE, UNSPECIFIED HF CHRONICITY, UNSPECIFIED HEART FAILURE TYPE (HCC): Status: RESOLVED | Noted: 2025-04-29 | Resolved: 2025-04-29

## 2025-04-29 PROCEDURE — 99214 OFFICE O/P EST MOD 30 MIN: CPT | Performed by: INTERNAL MEDICINE

## 2025-04-29 PROCEDURE — 1123F ACP DISCUSS/DSCN MKR DOCD: CPT | Performed by: INTERNAL MEDICINE

## 2025-04-29 PROCEDURE — 3079F DIAST BP 80-89 MM HG: CPT | Performed by: INTERNAL MEDICINE

## 2025-04-29 PROCEDURE — 3075F SYST BP GE 130 - 139MM HG: CPT | Performed by: INTERNAL MEDICINE

## 2025-04-29 PROCEDURE — 1159F MED LIST DOCD IN RCRD: CPT | Performed by: INTERNAL MEDICINE

## 2025-04-29 RX ORDER — ESCITALOPRAM OXALATE 10 MG/1
10 TABLET ORAL DAILY
Qty: 90 TABLET | Refills: 1 | Status: SHIPPED | OUTPATIENT
Start: 2025-04-29

## 2025-04-29 RX ORDER — ESCITALOPRAM OXALATE 10 MG/1
10 TABLET ORAL DAILY
COMMUNITY
End: 2025-04-29 | Stop reason: SDUPTHER

## 2025-04-29 ASSESSMENT — ENCOUNTER SYMPTOMS: SHORTNESS OF BREATH: 0

## 2025-04-29 NOTE — PROGRESS NOTES
lb 9.6 oz)     Estimated body mass index is 31.73 kg/m² as calculated from the following:    Height as of 3/28/25: 1.676 m (5' 6\").    Weight as of this encounter: 89.2 kg (196 lb 9.6 oz).    Physical Exam  Vitals reviewed.   Constitutional:       General: She is not in acute distress.     Appearance: Normal appearance. She is well-developed. She is not ill-appearing, toxic-appearing or diaphoretic.   HENT:      Head: Normocephalic and atraumatic.   Eyes:      Conjunctiva/sclera: Conjunctivae normal.      Pupils: Pupils are equal, round, and reactive to light.   Cardiovascular:      Rate and Rhythm: Normal rate and regular rhythm.      Heart sounds: Normal heart sounds.   Pulmonary:      Effort: Pulmonary effort is normal. No respiratory distress.      Breath sounds: Normal breath sounds.   Musculoskeletal:      Cervical back: Normal range of motion and neck supple.      Right lower leg: No edema.      Left lower leg: No edema.   Skin:     General: Skin is warm and dry.   Neurological:      Mental Status: She is alert.           Tesfaye Sarmiento MD    This dictation was generated by voice recognition computer software.  Although all attempts are made to edit the dictation for accuracy, there may be errors in the transcription that are not intended.

## 2025-04-29 NOTE — ASSESSMENT & PLAN NOTE
-Self stopped lexapro about a week ago even though anxiety is uncontrolled. Recently called the office due to uncontrolled anxiety (while taking 5 mg lexapro and dose was recommended to be increased to 10 mg but had not done so). Will start with resuming lexapro at 5 mg, in 2 weeks increase to 10 mg   -f/u in 3 months

## 2025-04-29 NOTE — ASSESSMENT & PLAN NOTE
Now better   -continue metoprolol 50 mg daily, benicar 40 mg daily, amlodipine 10 mg   -ARB dose recently reduced by nephrology due to orthostatic hypotension, chlorthalidone and hydralazine DC'd as well  -hx of nephrectomy and CKD 3b   Dr. clifford

## 2025-04-29 NOTE — ASSESSMENT & PLAN NOTE
-sees Dr Cote with [facility], recently during angiogram found two aneurysms of the right internal carotid artery. One is a right ICA terminus aneurysm, measuring 6 mm (W) x 5 (H) x 4 (N). There is a second aneurysm along the dorsal wall of the communicating ICA segment, measuring 2 mm. For now plan to repeat MRA in October   -needs relatives screening (CTA head or MRA non-con brain).

## 2025-07-14 ENCOUNTER — PATIENT MESSAGE (OUTPATIENT)
Dept: INTERNAL MEDICINE CLINIC | Age: 80
End: 2025-07-14

## 2025-07-28 SDOH — HEALTH STABILITY: PHYSICAL HEALTH: ON AVERAGE, HOW MANY DAYS PER WEEK DO YOU ENGAGE IN MODERATE TO STRENUOUS EXERCISE (LIKE A BRISK WALK)?: 4 DAYS

## 2025-07-28 SDOH — HEALTH STABILITY: PHYSICAL HEALTH: ON AVERAGE, HOW MANY MINUTES DO YOU ENGAGE IN EXERCISE AT THIS LEVEL?: 30 MIN

## 2025-07-28 ASSESSMENT — PATIENT HEALTH QUESTIONNAIRE - PHQ9
2. FEELING DOWN, DEPRESSED OR HOPELESS: NOT AT ALL
SUM OF ALL RESPONSES TO PHQ QUESTIONS 1-9: 0
1. LITTLE INTEREST OR PLEASURE IN DOING THINGS: NOT AT ALL
SUM OF ALL RESPONSES TO PHQ QUESTIONS 1-9: 0

## 2025-07-28 ASSESSMENT — LIFESTYLE VARIABLES
HOW MANY STANDARD DRINKS CONTAINING ALCOHOL DO YOU HAVE ON A TYPICAL DAY: PATIENT DOES NOT DRINK
HOW OFTEN DO YOU HAVE SIX OR MORE DRINKS ON ONE OCCASION: 1
HOW OFTEN DO YOU HAVE A DRINK CONTAINING ALCOHOL: NEVER
HOW MANY STANDARD DRINKS CONTAINING ALCOHOL DO YOU HAVE ON A TYPICAL DAY: 0
HOW OFTEN DO YOU HAVE A DRINK CONTAINING ALCOHOL: 1

## 2025-07-29 ENCOUNTER — OFFICE VISIT (OUTPATIENT)
Dept: INTERNAL MEDICINE CLINIC | Age: 80
End: 2025-07-29
Payer: MEDICARE

## 2025-07-29 VITALS
DIASTOLIC BLOOD PRESSURE: 84 MMHG | SYSTOLIC BLOOD PRESSURE: 136 MMHG | BODY MASS INDEX: 34.21 KG/M2 | WEIGHT: 200.4 LBS | HEART RATE: 91 BPM | HEIGHT: 64 IN | TEMPERATURE: 97.3 F | OXYGEN SATURATION: 97 %

## 2025-07-29 DIAGNOSIS — R73.03 PREDIABETES: ICD-10-CM

## 2025-07-29 DIAGNOSIS — Z23 NEED FOR PROPHYLACTIC VACCINATION AGAINST DIPHTHERIA-TETANUS-PERTUSSIS (DTP): ICD-10-CM

## 2025-07-29 DIAGNOSIS — Z00.00 MEDICARE ANNUAL WELLNESS VISIT, SUBSEQUENT: Primary | ICD-10-CM

## 2025-07-29 DIAGNOSIS — Z13.220 LIPID SCREENING: ICD-10-CM

## 2025-07-29 DIAGNOSIS — I10 ESSENTIAL HYPERTENSION: ICD-10-CM

## 2025-07-29 DIAGNOSIS — F41.9 ANXIETY: ICD-10-CM

## 2025-07-29 DIAGNOSIS — I67.1 BRAIN ANEURYSM: ICD-10-CM

## 2025-07-29 DIAGNOSIS — Z12.31 ENCOUNTER FOR SCREENING MAMMOGRAM FOR MALIGNANT NEOPLASM OF BREAST: ICD-10-CM

## 2025-07-29 PROCEDURE — 1123F ACP DISCUSS/DSCN MKR DOCD: CPT | Performed by: INTERNAL MEDICINE

## 2025-07-29 PROCEDURE — G0439 PPPS, SUBSEQ VISIT: HCPCS | Performed by: INTERNAL MEDICINE

## 2025-07-29 PROCEDURE — 3075F SYST BP GE 130 - 139MM HG: CPT | Performed by: INTERNAL MEDICINE

## 2025-07-29 PROCEDURE — 3079F DIAST BP 80-89 MM HG: CPT | Performed by: INTERNAL MEDICINE

## 2025-07-29 PROCEDURE — 1159F MED LIST DOCD IN RCRD: CPT | Performed by: INTERNAL MEDICINE

## 2025-07-29 RX ORDER — ESCITALOPRAM OXALATE 10 MG/1
10 TABLET ORAL DAILY
Qty: 90 TABLET | Refills: 1 | Status: SHIPPED | OUTPATIENT
Start: 2025-07-29

## 2025-07-29 RX ORDER — OLMESARTAN MEDOXOMIL 20 MG/1
20 TABLET ORAL 2 TIMES DAILY
COMMUNITY

## 2025-07-29 NOTE — PROGRESS NOTES
Medicare Annual Wellness Visit    Cecy Nina is here for Medicare AWV    Assessment & Plan   Medicare annual wellness visit, subsequent  Assessment & Plan:  Here for annual wellness visit, overall doing well from a clinical standpoint, other for below.  As for health maintenance:  -breast cancer screen: last mammo 10/2024 neg  -colon cancer screen: Dr. Lyons last year and reports not repeating anymore   -lung cancer screen: not indicated  -osteoporosis screen: last DEXA 2022 normal   -BP as below  -HCV completed tx and PCR undetectable   -negative depression and DV screen  -Independent of ADLs and IADLs, declines sig memory change or further w/u at this point though her cognitive screen is abnormal today   -Advised to eat a healthy, well-balanced diet  -Advised to exercise at least 30min/day 5x/week for heart and bone health  -Check skin regularly for new moles or changes in moles. wear sunscreen at least SPF 30 and don't forget to reapply every 3-4 hours or if you are in the water  -Follow up with dentist at least twice/year.  -Follow up with eye doctor at least once/year.  -Calcium goal is 1200 mg a day ideally from diet (dairy, green leafy vegetables, nuts) , at least 800 units of vitamin D  -has a living will  Need for prophylactic vaccination against diphtheria-tetanus-pertussis (DTP)  -     Tdap (ADACEL) 5-2-15.5 LF-MCG/0.5 injection; Inject 0.5 mLs into the muscle once for 1 dose, Disp-0.5 mL, R-0Print  Essential hypertension  Assessment & Plan:  Fair control but given brain aneurysm and kidney hx I have a lower goal, benicar was just increased by nephro and she would like to wait a little before making another change    -continue metoprolol 50 mg daily, benicar 20 mg twice daily, amlodipine 10 mg   -chlorthalidone and hydralazine DC'd by nephro   -hx of nephrectomy and CKD 3b  -sees Dr. clifford  Orders:  -     Comprehensive Metabolic Panel; Future  Encounter for screening mammogram for malignant

## 2025-07-29 NOTE — ASSESSMENT & PLAN NOTE
Fair control but given brain aneurysm and kidney hx I have a lower goal, benicar was just increased by nephro and she would like to wait a little before making another change    -continue metoprolol 50 mg daily, benicar 20 mg twice daily, amlodipine 10 mg   -chlorthalidone and hydralazine DC'd by nephro   -hx of nephrectomy and CKD 3b  -sees Dr. clifford

## 2025-07-29 NOTE — ASSESSMENT & PLAN NOTE
Here for annual wellness visit, overall doing well from a clinical standpoint, other for below.  As for health maintenance:  -breast cancer screen: last mammo 10/2024 neg  -colon cancer screen: Dr. Lyons last year and reports not repeating anymore   -lung cancer screen: not indicated  -osteoporosis screen: last DEXA 2022 normal   -BP as below  -HCV completed tx and PCR undetectable   -negative depression and DV screen  -Independent of ADLs and IADLs, declines sig memory change or further w/u at this point though her cognitive screen is abnormal today   -Advised to eat a healthy, well-balanced diet  -Advised to exercise at least 30min/day 5x/week for heart and bone health  -Check skin regularly for new moles or changes in moles. wear sunscreen at least SPF 30 and don't forget to reapply every 3-4 hours or if you are in the water  -Follow up with dentist at least twice/year.  -Follow up with eye doctor at least once/year.  -Calcium goal is 1200 mg a day ideally from diet (dairy, green leafy vegetables, nuts) , at least 800 units of vitamin D  -has a living will

## (undated) DEVICE — PROVE COVER: Brand: UNBRANDED

## (undated) DEVICE — RADIFOCUS GLIDEWIRE: Brand: GLIDEWIRE

## (undated) DEVICE — BAND COMPR L24CM REG CLR PLAS HEMSTAT EXT HK AND LOOP RETEN

## (undated) DEVICE — RADIFOCUS GLIDECATH: Brand: GLIDECATH

## (undated) DEVICE — TR BAND RADIAL ARTERY COMPRESSION DEVICE: Brand: TR BAND

## (undated) DEVICE — CATH LAB PACK: Brand: MEDLINE INDUSTRIES, INC.

## (undated) DEVICE — GLIDESHEATH SLENDER STAINLESS STEEL KIT: Brand: GLIDESHEATH SLENDER

## (undated) DEVICE — PAD, DEFIB, ADULT, RADIOTRANS, PHYSIO: Brand: MEDLINE